# Patient Record
Sex: FEMALE | Race: WHITE | HISPANIC OR LATINO | Employment: FULL TIME | URBAN - METROPOLITAN AREA
[De-identification: names, ages, dates, MRNs, and addresses within clinical notes are randomized per-mention and may not be internally consistent; named-entity substitution may affect disease eponyms.]

---

## 2021-05-25 ENCOUNTER — RECORDS - HEALTHEAST (OUTPATIENT)
Dept: ADMINISTRATIVE | Facility: CLINIC | Age: 51
End: 2021-05-25

## 2021-05-28 ENCOUNTER — RECORDS - HEALTHEAST (OUTPATIENT)
Dept: ADMINISTRATIVE | Facility: CLINIC | Age: 51
End: 2021-05-28

## 2021-05-29 ENCOUNTER — RECORDS - HEALTHEAST (OUTPATIENT)
Dept: ADMINISTRATIVE | Facility: CLINIC | Age: 51
End: 2021-05-29

## 2021-05-30 ENCOUNTER — RECORDS - HEALTHEAST (OUTPATIENT)
Dept: ADMINISTRATIVE | Facility: CLINIC | Age: 51
End: 2021-05-30

## 2021-05-31 ENCOUNTER — RECORDS - HEALTHEAST (OUTPATIENT)
Dept: ADMINISTRATIVE | Facility: CLINIC | Age: 51
End: 2021-05-31

## 2021-06-01 ENCOUNTER — RECORDS - HEALTHEAST (OUTPATIENT)
Dept: ADMINISTRATIVE | Facility: CLINIC | Age: 51
End: 2021-06-01

## 2022-12-30 ENCOUNTER — HOSPITAL ENCOUNTER (EMERGENCY)
Facility: CLINIC | Age: 52
End: 2022-12-30

## 2022-12-30 ENCOUNTER — HOSPITAL ENCOUNTER (EMERGENCY)
Facility: CLINIC | Age: 52
Discharge: HOME | End: 2022-12-31
Attending: EMERGENCY MEDICINE | Admitting: EMERGENCY MEDICINE
Payer: COMMERCIAL

## 2022-12-30 VITALS
OXYGEN SATURATION: 98 % | RESPIRATION RATE: 18 BRPM | SYSTOLIC BLOOD PRESSURE: 142 MMHG | HEART RATE: 111 BPM | TEMPERATURE: 100.2 F | DIASTOLIC BLOOD PRESSURE: 91 MMHG

## 2022-12-30 DIAGNOSIS — R10.9 RIGHT FLANK PAIN: ICD-10-CM

## 2022-12-30 LAB
ALBUMIN UR-MCNC: NEGATIVE MG/DL
ANION GAP SERPL CALCULATED.3IONS-SCNC: 8 MMOL/L (ref 3–14)
APPEARANCE UR: CLEAR
BASOPHILS # BLD AUTO: 0 10E3/UL (ref 0–0.2)
BASOPHILS NFR BLD AUTO: 0 %
BILIRUB UR QL STRIP: NEGATIVE
BUN SERPL-MCNC: 24 MG/DL (ref 7–30)
CALCIUM SERPL-MCNC: 9.3 MG/DL (ref 8.5–10.1)
CHLORIDE BLD-SCNC: 104 MMOL/L (ref 94–109)
CO2 SERPL-SCNC: 26 MMOL/L (ref 20–32)
COLOR UR AUTO: NORMAL
CREAT SERPL-MCNC: 0.74 MG/DL (ref 0.52–1.04)
EOSINOPHIL # BLD AUTO: 0.1 10E3/UL (ref 0–0.7)
EOSINOPHIL NFR BLD AUTO: 1 %
ERYTHROCYTE [DISTWIDTH] IN BLOOD BY AUTOMATED COUNT: 12.7 % (ref 10–15)
GFR SERPL CREATININE-BSD FRML MDRD: >90 ML/MIN/1.73M2
GLUCOSE BLD-MCNC: 107 MG/DL (ref 70–99)
GLUCOSE UR STRIP-MCNC: NEGATIVE MG/DL
HCG UR QL: NEGATIVE
HCT VFR BLD AUTO: 41.6 % (ref 35–47)
HGB BLD-MCNC: 13.9 G/DL (ref 11.7–15.7)
HGB UR QL STRIP: NEGATIVE
IMM GRANULOCYTES # BLD: 0 10E3/UL
IMM GRANULOCYTES NFR BLD: 0 %
KETONES UR STRIP-MCNC: NEGATIVE MG/DL
LEUKOCYTE ESTERASE UR QL STRIP: NEGATIVE
LYMPHOCYTES # BLD AUTO: 2.6 10E3/UL (ref 0.8–5.3)
LYMPHOCYTES NFR BLD AUTO: 29 %
MCH RBC QN AUTO: 30 PG (ref 26.5–33)
MCHC RBC AUTO-ENTMCNC: 33.4 G/DL (ref 31.5–36.5)
MCV RBC AUTO: 90 FL (ref 78–100)
MONOCYTES # BLD AUTO: 0.7 10E3/UL (ref 0–1.3)
MONOCYTES NFR BLD AUTO: 7 %
NEUTROPHILS # BLD AUTO: 5.6 10E3/UL (ref 1.6–8.3)
NEUTROPHILS NFR BLD AUTO: 63 %
NITRATE UR QL: NEGATIVE
NRBC # BLD AUTO: 0 10E3/UL
NRBC BLD AUTO-RTO: 0 /100
PH UR STRIP: 5.5 [PH] (ref 5–7)
PLATELET # BLD AUTO: 251 10E3/UL (ref 150–450)
POTASSIUM BLD-SCNC: 3.8 MMOL/L (ref 3.4–5.3)
RBC # BLD AUTO: 4.64 10E6/UL (ref 3.8–5.2)
SODIUM SERPL-SCNC: 138 MMOL/L (ref 133–144)
SP GR UR STRIP: 1.02 (ref 1–1.03)
UROBILINOGEN UR STRIP-MCNC: NORMAL MG/DL
WBC # BLD AUTO: 9.1 10E3/UL (ref 4–11)

## 2022-12-30 PROCEDURE — 258N000003 HC RX IP 258 OP 636: Performed by: EMERGENCY MEDICINE

## 2022-12-30 PROCEDURE — 250N000011 HC RX IP 250 OP 636: Performed by: EMERGENCY MEDICINE

## 2022-12-30 PROCEDURE — 81003 URINALYSIS AUTO W/O SCOPE: CPT | Performed by: EMERGENCY MEDICINE

## 2022-12-30 PROCEDURE — 80048 BASIC METABOLIC PNL TOTAL CA: CPT | Performed by: EMERGENCY MEDICINE

## 2022-12-30 PROCEDURE — 81025 URINE PREGNANCY TEST: CPT | Performed by: EMERGENCY MEDICINE

## 2022-12-30 PROCEDURE — 96361 HYDRATE IV INFUSION ADD-ON: CPT

## 2022-12-30 PROCEDURE — 36415 COLL VENOUS BLD VENIPUNCTURE: CPT | Performed by: EMERGENCY MEDICINE

## 2022-12-30 PROCEDURE — 96374 THER/PROPH/DIAG INJ IV PUSH: CPT

## 2022-12-30 PROCEDURE — 85025 COMPLETE CBC W/AUTO DIFF WBC: CPT | Performed by: EMERGENCY MEDICINE

## 2022-12-30 PROCEDURE — 99285 EMERGENCY DEPT VISIT HI MDM: CPT | Mod: 25

## 2022-12-30 RX ORDER — ONDANSETRON 2 MG/ML
4 INJECTION INTRAMUSCULAR; INTRAVENOUS ONCE
Status: COMPLETED | OUTPATIENT
Start: 2022-12-30 | End: 2022-12-30

## 2022-12-30 RX ORDER — ONDANSETRON 2 MG/ML
INJECTION INTRAMUSCULAR; INTRAVENOUS
Status: DISCONTINUED
Start: 2022-12-30 | End: 2022-12-30 | Stop reason: HOSPADM

## 2022-12-30 RX ADMIN — ONDANSETRON 4 MG: 2 INJECTION INTRAMUSCULAR; INTRAVENOUS at 20:33

## 2022-12-30 RX ADMIN — SODIUM CHLORIDE 1000 ML: 9 INJECTION, SOLUTION INTRAVENOUS at 20:37

## 2022-12-31 ENCOUNTER — APPOINTMENT (OUTPATIENT)
Dept: CT IMAGING | Facility: CLINIC | Age: 52
End: 2022-12-31
Attending: EMERGENCY MEDICINE
Payer: COMMERCIAL

## 2022-12-31 PROCEDURE — 96376 TX/PRO/DX INJ SAME DRUG ADON: CPT

## 2022-12-31 PROCEDURE — 250N000011 HC RX IP 250 OP 636: Performed by: EMERGENCY MEDICINE

## 2022-12-31 PROCEDURE — 96375 TX/PRO/DX INJ NEW DRUG ADDON: CPT

## 2022-12-31 PROCEDURE — 74176 CT ABD & PELVIS W/O CONTRAST: CPT

## 2022-12-31 RX ORDER — KETOROLAC TROMETHAMINE 15 MG/ML
15 INJECTION, SOLUTION INTRAMUSCULAR; INTRAVENOUS ONCE
Status: COMPLETED | OUTPATIENT
Start: 2022-12-31 | End: 2022-12-31

## 2022-12-31 RX ORDER — OXYCODONE HYDROCHLORIDE 5 MG/1
5 TABLET ORAL EVERY 6 HOURS PRN
Qty: 6 TABLET | Refills: 0 | Status: SHIPPED | OUTPATIENT
Start: 2022-12-31 | End: 2023-01-03

## 2022-12-31 RX ORDER — ONDANSETRON 2 MG/ML
4 INJECTION INTRAMUSCULAR; INTRAVENOUS EVERY 30 MIN PRN
Status: DISCONTINUED | OUTPATIENT
Start: 2022-12-31 | End: 2022-12-31 | Stop reason: HOSPADM

## 2022-12-31 RX ORDER — CEPHALEXIN 500 MG/1
500 CAPSULE ORAL 4 TIMES DAILY
Qty: 28 CAPSULE | Refills: 0 | Status: SHIPPED | OUTPATIENT
Start: 2022-12-31 | End: 2023-01-07

## 2022-12-31 RX ADMIN — ONDANSETRON 4 MG: 2 INJECTION INTRAMUSCULAR; INTRAVENOUS at 00:08

## 2022-12-31 RX ADMIN — KETOROLAC TROMETHAMINE 15 MG: 15 INJECTION, SOLUTION INTRAMUSCULAR; INTRAVENOUS at 00:07

## 2022-12-31 ASSESSMENT — ACTIVITIES OF DAILY LIVING (ADL): ADLS_ACUITY_SCORE: 35

## 2022-12-31 ASSESSMENT — ENCOUNTER SYMPTOMS
BACK PAIN: 0
SHORTNESS OF BREATH: 0
FEVER: 0
ABDOMINAL PAIN: 0
FLANK PAIN: 1
NAUSEA: 1

## 2022-12-31 NOTE — ED TRIAGE NOTES
Pt reports hx of kidney stones with surgery 7 years ago, today onset L flank pain with nausea.      Triage Assessment     Row Name 12/30/22 2012       Triage Assessment (Adult)    Airway WDL WDL       Respiratory WDL    Respiratory WDL WDL       Skin Circulation/Temperature WDL    Skin Circulation/Temperature WDL WDL       Cardiac WDL    Cardiac WDL WDL       Peripheral/Neurovascular WDL    Peripheral Neurovascular WDL WDL       Cognitive/Neuro/Behavioral WDL    Cognitive/Neuro/Behavioral WDL WDL

## 2022-12-31 NOTE — ED PROVIDER NOTES
History   Chief Complaint:  Flank Pain       The history is provided by the patient.      Amalia Blackwood is a 52 year old female with history of nephrolithiasis who presents with right sided flank pain. Today at 1300, patient reports developing right sided flank pain and nausea. She notes that the pain does not radiate down her legs. She states that she has a history of kidney stones and states that most of her stones get stuck in her kidneys. She notes that usually a balloon has been placed that helps move the stones but 7 years ago she received a laser procedure on her kidneys to reduce frequency of kidney stones. Patient reports that movement exacerbates the pain. Patient denies fever, abdominal pain, chest pain, shortness of breath, or lower back pain.  She denies saddle anesthesia, bowel or bladder incontinence, history of lumbar spine surgery, IV drug use.      Review of Systems   Constitutional: Negative for fever.   Respiratory: Negative for shortness of breath.    Cardiovascular: Negative for chest pain.   Gastrointestinal: Positive for nausea. Negative for abdominal pain.   Genitourinary: Positive for flank pain (right side).   Musculoskeletal: Negative for back pain.   All other systems reviewed and are negative.        Allergies:  Aspirin   Acetaminophen     Medications:  The patient is currently on no regular medications.    Past Medical History:     Hyperlipidemia  Hypercholesterolemia   Nephrolithiasis      Past Surgical History:    Ureteroscopic stone extraction   Cystoscopy   Stent placement   Laparoscopy   Mass excision   Eye surgery   D&C   Vitrectomy x3   Colonoscopy     Social History:  The patient presents to the ED alone.   Patient presents to the ED via private vehicle.    Physical Exam     Patient Vitals for the past 24 hrs:   BP Temp Temp src Pulse Resp SpO2   12/30/22 2011 (!) 142/91 100.2  F (37.9  C) Oral 111 18 98 %       Physical Exam  General: Laying on the ED bed, no  distress  HEENT: Normocephalic, atraumatic  Cardiac: Warm and well perfused, regular rate and rhythm  Pulm: Breathing comfortably, no accessory muscle usage, no conversational dyspnea, and lungs clear bilaterally  GI: Abdomen soft, nontender, no rigidity or guarding  MSK: No deformities, right CVAT  Skin: Warm and dry  Neuro: Moves all extremities, sensory intact distal L4-S1 dermatomes bilaterally, 5/5 strength bilateral ankle plantar and dorsiflexion  Psych: Normal mood and affect      Emergency Department Course     Imaging:  CT Abdomen Pelvis w/o Contrast   Final Result   IMPRESSION:    1.  Normal appendix. No obstruction, colitis, diverticulitis, or appendicitis.   2.  Bilateral nonobstructing renal stones. No obstructing ureteral stones.           Report per radiology    Laboratory:  Labs Ordered and Resulted from Time of ED Arrival to Time of ED Departure   BASIC METABOLIC PANEL - Abnormal       Result Value    Sodium 138      Potassium 3.8      Chloride 104      Carbon Dioxide (CO2) 26      Anion Gap 8      Urea Nitrogen 24      Creatinine 0.74      Calcium 9.3      Glucose 107 (*)     GFR Estimate >90     UA MACROSCOPIC WITH REFLEX TO MICRO AND CULTURE - Normal    Color Urine Light Yellow      Appearance Urine Clear      Glucose Urine Negative      Bilirubin Urine Negative      Ketones Urine Negative      Specific Gravity Urine 1.023      Blood Urine Negative      pH Urine 5.5      Protein Albumin Urine Negative      Urobilinogen Urine Normal      Nitrite Urine Negative      Leukocyte Esterase Urine Negative     HCG QUALITATIVE URINE - Normal    hCG Urine Qualitative Negative     CBC WITH PLATELETS AND DIFFERENTIAL    WBC Count 9.1      RBC Count 4.64      Hemoglobin 13.9      Hematocrit 41.6      MCV 90      MCH 30.0      MCHC 33.4      RDW 12.7      Platelet Count 251      % Neutrophils 63      % Lymphocytes 29      % Monocytes 7      % Eosinophils 1      % Basophils 0      % Immature Granulocytes 0       NRBCs per 100 WBC 0      Absolute Neutrophils 5.6      Absolute Lymphocytes 2.6      Absolute Monocytes 0.7      Absolute Eosinophils 0.1      Absolute Basophils 0.0      Absolute Immature Granulocytes 0.0      Absolute NRBCs 0.0        Emergency Department Course:       Reviewed:  I reviewed nursing notes, vitals, past medical history and Care Everywhere    Assessments:   I obtained history and examined the patient as noted above.   100 I rechecked the patient and explained findings. At this point I feel that the patient is safe for discharge, and the patient agrees.    Interventions:   Zofran 4mg IV    NS 1L IV   7 Toradol 15mg IV   8 Zofran 4mg IV     Disposition:  The patient was discharged to home.     Impression & Plan     CMS Diagnoses: None    Medical Decision Makin-year-old female presents with flank pain as above.  She has a history of kidney stones and thinks that this pain is similar.  She is well-appearing on exam and does not appear systemically ill.  Her CT shows bilateral renal stones with no signs of obstruction and no stones in the ureters.  She states that she has had pain in the past from stones that are still in the kidneys and thinks that tonight's episode is from the same.  Also considered lumbar back pain as a possible etiology.  This remains a possibility, the patient has no signs of cauda equina.  The patient's temperature here was 100.2 Fahrenheit and in the setting of her flank pain electing to treat with Keflex despite her negative UA.  Plan at this time is for discharge home with prescriptions for Keflex, oxycodone for pain, and urology follow-up at the patient's previous provider.    Diagnosis:    ICD-10-CM    1. Right flank pain  R10.9           Discharge Medications:  Discharge Medication List as of 2022  1:27 AM      START taking these medications    Details   cephALEXin (KEFLEX) 500 MG capsule Take 1 capsule (500 mg) by mouth 4 times daily for 7 days,  Disp-28 capsule, R-0, E-Prescribe      oxyCODONE (ROXICODONE) 5 MG tablet Take 1 tablet (5 mg) by mouth every 6 hours as needed for severe pain (7-10), Disp-6 tablet, R-0, E-Prescribe             Scribe Disclosure:  I, Inez Collins, am serving as a scribe at 11:54 PM on 12/30/2022 to document services personally performed by Tye Gaytan MD based on my observations and the provider's statements to me.            Tye Gaytan MD  12/31/22 3627

## 2024-01-10 LAB
ALBUMIN SERPL BCG-MCNC: 4.3 G/DL (ref 3.5–5.2)
ALP SERPL-CCNC: 122 U/L (ref 40–150)
ALT SERPL W P-5'-P-CCNC: 35 U/L (ref 0–50)
ANION GAP SERPL CALCULATED.3IONS-SCNC: 7 MMOL/L (ref 7–15)
AST SERPL W P-5'-P-CCNC: 25 U/L (ref 0–45)
BASOPHILS # BLD AUTO: 0 10E3/UL (ref 0–0.2)
BASOPHILS NFR BLD AUTO: 1 %
BILIRUB SERPL-MCNC: 0.3 MG/DL
BUN SERPL-MCNC: 18.4 MG/DL (ref 6–20)
CALCIUM SERPL-MCNC: 9.4 MG/DL (ref 8.6–10)
CHLORIDE SERPL-SCNC: 103 MMOL/L (ref 98–107)
CREAT SERPL-MCNC: 0.62 MG/DL (ref 0.51–0.95)
DEPRECATED HCO3 PLAS-SCNC: 28 MMOL/L (ref 22–29)
EGFRCR SERPLBLD CKD-EPI 2021: >90 ML/MIN/1.73M2
EOSINOPHIL # BLD AUTO: 0.1 10E3/UL (ref 0–0.7)
EOSINOPHIL NFR BLD AUTO: 2 %
ERYTHROCYTE [DISTWIDTH] IN BLOOD BY AUTOMATED COUNT: 12.4 % (ref 10–15)
GLUCOSE SERPL-MCNC: 118 MG/DL (ref 70–99)
HCT VFR BLD AUTO: 39.4 % (ref 35–47)
HGB BLD-MCNC: 13.2 G/DL (ref 11.7–15.7)
HOLD SPECIMEN: NORMAL
IMM GRANULOCYTES # BLD: 0 10E3/UL
IMM GRANULOCYTES NFR BLD: 0 %
LYMPHOCYTES # BLD AUTO: 2.9 10E3/UL (ref 0.8–5.3)
LYMPHOCYTES NFR BLD AUTO: 45 %
MCH RBC QN AUTO: 28.9 PG (ref 26.5–33)
MCHC RBC AUTO-ENTMCNC: 33.5 G/DL (ref 31.5–36.5)
MCV RBC AUTO: 86 FL (ref 78–100)
MONOCYTES # BLD AUTO: 0.5 10E3/UL (ref 0–1.3)
MONOCYTES NFR BLD AUTO: 8 %
NEUTROPHILS # BLD AUTO: 2.8 10E3/UL (ref 1.6–8.3)
NEUTROPHILS NFR BLD AUTO: 44 %
NRBC # BLD AUTO: 0 10E3/UL
NRBC BLD AUTO-RTO: 0 /100
PLATELET # BLD AUTO: 241 10E3/UL (ref 150–450)
POTASSIUM SERPL-SCNC: 4.3 MMOL/L (ref 3.4–5.3)
PROT SERPL-MCNC: 6.8 G/DL (ref 6.4–8.3)
RBC # BLD AUTO: 4.56 10E6/UL (ref 3.8–5.2)
SODIUM SERPL-SCNC: 138 MMOL/L (ref 135–145)
TROPONIN T SERPL HS-MCNC: <6 NG/L
WBC # BLD AUTO: 6.4 10E3/UL (ref 4–11)

## 2024-01-10 PROCEDURE — 84484 ASSAY OF TROPONIN QUANT: CPT | Performed by: EMERGENCY MEDICINE

## 2024-01-10 PROCEDURE — 93005 ELECTROCARDIOGRAM TRACING: CPT

## 2024-01-10 PROCEDURE — 85025 COMPLETE CBC W/AUTO DIFF WBC: CPT | Performed by: EMERGENCY MEDICINE

## 2024-01-10 PROCEDURE — 80053 COMPREHEN METABOLIC PANEL: CPT | Performed by: EMERGENCY MEDICINE

## 2024-01-10 PROCEDURE — 99285 EMERGENCY DEPT VISIT HI MDM: CPT | Mod: 25

## 2024-01-10 PROCEDURE — 36415 COLL VENOUS BLD VENIPUNCTURE: CPT | Performed by: EMERGENCY MEDICINE

## 2024-01-11 ENCOUNTER — HOSPITAL ENCOUNTER (OUTPATIENT)
Facility: CLINIC | Age: 54
Setting detail: OBSERVATION
Discharge: HOME | End: 2024-01-12
Attending: EMERGENCY MEDICINE | Admitting: INTERNAL MEDICINE
Payer: COMMERCIAL

## 2024-01-11 ENCOUNTER — TRANSCRIBE ORDERS (OUTPATIENT)
Dept: OTHER | Age: 54
End: 2024-01-11

## 2024-01-11 ENCOUNTER — APPOINTMENT (OUTPATIENT)
Dept: GENERAL RADIOLOGY | Facility: CLINIC | Age: 54
End: 2024-01-11
Attending: EMERGENCY MEDICINE
Payer: COMMERCIAL

## 2024-01-11 DIAGNOSIS — Z88.9 DRUG ALLERGY: Primary | ICD-10-CM

## 2024-01-11 DIAGNOSIS — R07.9 CHEST PAIN, UNSPECIFIED TYPE: ICD-10-CM

## 2024-01-11 DIAGNOSIS — E78.00 PURE HYPERCHOLESTEROLEMIA: ICD-10-CM

## 2024-01-11 DIAGNOSIS — I25.10 CORONARY ARTERY DISEASE INVOLVING NATIVE CORONARY ARTERY OF NATIVE HEART, UNSPECIFIED WHETHER ANGINA PRESENT: Primary | ICD-10-CM

## 2024-01-11 LAB
ACT BLD: 278 SECONDS (ref 74–150)
ATRIAL RATE - MUSE: 83 BPM
DIASTOLIC BLOOD PRESSURE - MUSE: NORMAL MMHG
INTERPRETATION ECG - MUSE: NORMAL
P AXIS - MUSE: 12 DEGREES
PR INTERVAL - MUSE: 170 MS
QRS DURATION - MUSE: 74 MS
QT - MUSE: 360 MS
QTC - MUSE: 423 MS
R AXIS - MUSE: 37 DEGREES
SYSTOLIC BLOOD PRESSURE - MUSE: NORMAL MMHG
T AXIS - MUSE: 37 DEGREES
TROPONIN T SERPL HS-MCNC: <6 NG/L
VENTRICULAR RATE- MUSE: 83 BPM

## 2024-01-11 PROCEDURE — C9600 PERC DRUG-EL COR STENT SING: HCPCS | Performed by: INTERNAL MEDICINE

## 2024-01-11 PROCEDURE — 250N000009 HC RX 250: Performed by: INTERNAL MEDICINE

## 2024-01-11 PROCEDURE — C1769 GUIDE WIRE: HCPCS | Performed by: INTERNAL MEDICINE

## 2024-01-11 PROCEDURE — 99152 MOD SED SAME PHYS/QHP 5/>YRS: CPT | Mod: GC | Performed by: INTERNAL MEDICINE

## 2024-01-11 PROCEDURE — 96361 HYDRATE IV INFUSION ADD-ON: CPT | Mod: 59

## 2024-01-11 PROCEDURE — 71046 X-RAY EXAM CHEST 2 VIEWS: CPT

## 2024-01-11 PROCEDURE — 84484 ASSAY OF TROPONIN QUANT: CPT | Performed by: INTERNAL MEDICINE

## 2024-01-11 PROCEDURE — G0378 HOSPITAL OBSERVATION PER HR: HCPCS

## 2024-01-11 PROCEDURE — 99152 MOD SED SAME PHYS/QHP 5/>YRS: CPT | Performed by: INTERNAL MEDICINE

## 2024-01-11 PROCEDURE — 272N000001 HC OR GENERAL SUPPLY STERILE: Performed by: INTERNAL MEDICINE

## 2024-01-11 PROCEDURE — C1894 INTRO/SHEATH, NON-LASER: HCPCS | Performed by: INTERNAL MEDICINE

## 2024-01-11 PROCEDURE — C1725 CATH, TRANSLUMIN NON-LASER: HCPCS | Performed by: INTERNAL MEDICINE

## 2024-01-11 PROCEDURE — 250N000011 HC RX IP 250 OP 636: Performed by: INTERNAL MEDICINE

## 2024-01-11 PROCEDURE — 93454 CORONARY ARTERY ANGIO S&I: CPT | Mod: 26 | Performed by: INTERNAL MEDICINE

## 2024-01-11 PROCEDURE — 85347 COAGULATION TIME ACTIVATED: CPT

## 2024-01-11 PROCEDURE — 93005 ELECTROCARDIOGRAM TRACING: CPT

## 2024-01-11 PROCEDURE — 250N000013 HC RX MED GY IP 250 OP 250 PS 637: Performed by: INTERNAL MEDICINE

## 2024-01-11 PROCEDURE — 99207 PR APP CREDIT; MD BILLING SHARED VISIT: CPT | Performed by: INTERNAL MEDICINE

## 2024-01-11 PROCEDURE — 96360 HYDRATION IV INFUSION INIT: CPT | Mod: 59

## 2024-01-11 PROCEDURE — 99153 MOD SED SAME PHYS/QHP EA: CPT | Performed by: INTERNAL MEDICINE

## 2024-01-11 PROCEDURE — C1887 CATHETER, GUIDING: HCPCS | Performed by: INTERNAL MEDICINE

## 2024-01-11 PROCEDURE — 92928 PRQ TCAT PLMT NTRAC ST 1 LES: CPT | Mod: LD | Performed by: INTERNAL MEDICINE

## 2024-01-11 PROCEDURE — 258N000003 HC RX IP 258 OP 636: Performed by: INTERNAL MEDICINE

## 2024-01-11 PROCEDURE — 93010 ELECTROCARDIOGRAM REPORT: CPT | Performed by: INTERNAL MEDICINE

## 2024-01-11 PROCEDURE — C1874 STENT, COATED/COV W/DEL SYS: HCPCS | Performed by: INTERNAL MEDICINE

## 2024-01-11 PROCEDURE — 93454 CORONARY ARTERY ANGIO S&I: CPT | Performed by: INTERNAL MEDICINE

## 2024-01-11 PROCEDURE — 36415 COLL VENOUS BLD VENIPUNCTURE: CPT | Performed by: INTERNAL MEDICINE

## 2024-01-11 PROCEDURE — 999N000054 HC STATISTIC EKG NON-CHARGEABLE

## 2024-01-11 PROCEDURE — 99222 1ST HOSP IP/OBS MODERATE 55: CPT | Mod: 25 | Performed by: NURSE PRACTITIONER

## 2024-01-11 PROCEDURE — 99221 1ST HOSP IP/OBS SF/LOW 40: CPT | Performed by: INTERNAL MEDICINE

## 2024-01-11 DEVICE — STENT CORONARY DES SYNERGY XD MR US 3.00X28MM H7493941828300: Type: IMPLANTABLE DEVICE | Status: FUNCTIONAL

## 2024-01-11 RX ORDER — CLOPIDOGREL BISULFATE 75 MG/1
75 TABLET ORAL DAILY
Status: DISCONTINUED | OUTPATIENT
Start: 2024-01-11 | End: 2024-01-11

## 2024-01-11 RX ORDER — FENTANYL CITRATE 50 UG/ML
INJECTION, SOLUTION INTRAMUSCULAR; INTRAVENOUS
Status: DISCONTINUED | OUTPATIENT
Start: 2024-01-11 | End: 2024-01-11 | Stop reason: HOSPADM

## 2024-01-11 RX ORDER — METOPROLOL TARTRATE 1 MG/ML
5 INJECTION, SOLUTION INTRAVENOUS
Status: DISCONTINUED | OUTPATIENT
Start: 2024-01-11 | End: 2024-01-12 | Stop reason: HOSPADM

## 2024-01-11 RX ORDER — NITROGLYCERIN 0.4 MG/1
0.4 TABLET SUBLINGUAL EVERY 5 MIN PRN
Status: DISCONTINUED | OUTPATIENT
Start: 2024-01-11 | End: 2024-01-11

## 2024-01-11 RX ORDER — SOD.CHLORID/POTASSIUM CHLORIDE 287-180-15
1 TABLET ORAL 3 TIMES DAILY PRN
COMMUNITY

## 2024-01-11 RX ORDER — POTASSIUM CHLORIDE 1500 MG/1
20 TABLET, EXTENDED RELEASE ORAL
Status: DISCONTINUED | OUTPATIENT
Start: 2024-01-11 | End: 2024-01-11 | Stop reason: HOSPADM

## 2024-01-11 RX ORDER — CLOPIDOGREL BISULFATE 75 MG/1
75 TABLET ORAL DAILY
COMMUNITY
End: 2024-01-18

## 2024-01-11 RX ORDER — ACETAMINOPHEN 650 MG/1
650 SUPPOSITORY RECTAL EVERY 4 HOURS PRN
Status: DISCONTINUED | OUTPATIENT
Start: 2024-01-11 | End: 2024-01-12 | Stop reason: HOSPADM

## 2024-01-11 RX ORDER — NITROGLYCERIN 5 MG/ML
VIAL (ML) INTRAVENOUS
Status: DISCONTINUED | OUTPATIENT
Start: 2024-01-11 | End: 2024-01-11 | Stop reason: HOSPADM

## 2024-01-11 RX ORDER — VERAPAMIL HYDROCHLORIDE 2.5 MG/ML
INJECTION, SOLUTION INTRAVENOUS
Status: DISCONTINUED | OUTPATIENT
Start: 2024-01-11 | End: 2024-01-11 | Stop reason: HOSPADM

## 2024-01-11 RX ORDER — ONDANSETRON 4 MG/1
4 TABLET, ORALLY DISINTEGRATING ORAL EVERY 6 HOURS PRN
Status: DISCONTINUED | OUTPATIENT
Start: 2024-01-11 | End: 2024-01-12 | Stop reason: HOSPADM

## 2024-01-11 RX ORDER — LORAZEPAM 2 MG/ML
0.5 INJECTION INTRAMUSCULAR
Status: DISCONTINUED | OUTPATIENT
Start: 2024-01-11 | End: 2024-01-11 | Stop reason: HOSPADM

## 2024-01-11 RX ORDER — HEPARIN SODIUM 1000 [USP'U]/ML
INJECTION, SOLUTION INTRAVENOUS; SUBCUTANEOUS
Status: DISCONTINUED | OUTPATIENT
Start: 2024-01-11 | End: 2024-01-11 | Stop reason: HOSPADM

## 2024-01-11 RX ORDER — HYDRALAZINE HYDROCHLORIDE 20 MG/ML
10 INJECTION INTRAMUSCULAR; INTRAVENOUS EVERY 4 HOURS PRN
Status: DISCONTINUED | OUTPATIENT
Start: 2024-01-11 | End: 2024-01-12 | Stop reason: HOSPADM

## 2024-01-11 RX ORDER — LORAZEPAM 0.5 MG/1
0.5 TABLET ORAL
Status: DISCONTINUED | OUTPATIENT
Start: 2024-01-11 | End: 2024-01-11 | Stop reason: HOSPADM

## 2024-01-11 RX ORDER — SODIUM CHLORIDE 9 MG/ML
INJECTION, SOLUTION INTRAVENOUS CONTINUOUS
Status: DISCONTINUED | OUTPATIENT
Start: 2024-01-11 | End: 2024-01-12

## 2024-01-11 RX ORDER — IOPAMIDOL 755 MG/ML
INJECTION, SOLUTION INTRAVASCULAR
Status: DISCONTINUED | OUTPATIENT
Start: 2024-01-11 | End: 2024-01-11 | Stop reason: HOSPADM

## 2024-01-11 RX ORDER — MAGNESIUM HYDROXIDE/ALUMINUM HYDROXICE/SIMETHICONE 120; 1200; 1200 MG/30ML; MG/30ML; MG/30ML
30 SUSPENSION ORAL EVERY 4 HOURS PRN
Status: DISCONTINUED | OUTPATIENT
Start: 2024-01-11 | End: 2024-01-12 | Stop reason: HOSPADM

## 2024-01-11 RX ORDER — ONDANSETRON 2 MG/ML
4 INJECTION INTRAMUSCULAR; INTRAVENOUS EVERY 6 HOURS PRN
Status: DISCONTINUED | OUTPATIENT
Start: 2024-01-11 | End: 2024-01-12 | Stop reason: HOSPADM

## 2024-01-11 RX ORDER — NITROGLYCERIN 0.4 MG/1
0.4 TABLET SUBLINGUAL EVERY 5 MIN PRN
Status: DISCONTINUED | OUTPATIENT
Start: 2024-01-11 | End: 2024-01-12 | Stop reason: HOSPADM

## 2024-01-11 RX ORDER — SODIUM CHLORIDE 9 MG/ML
INJECTION, SOLUTION INTRAVENOUS CONTINUOUS
Status: DISCONTINUED | OUTPATIENT
Start: 2024-01-11 | End: 2024-01-11 | Stop reason: HOSPADM

## 2024-01-11 RX ORDER — ROSUVASTATIN CALCIUM 20 MG/1
20 TABLET, COATED ORAL EVERY EVENING
Status: ON HOLD | COMMUNITY
End: 2024-01-12

## 2024-01-11 RX ORDER — ACETAMINOPHEN 325 MG/1
650 TABLET ORAL EVERY 4 HOURS PRN
Status: DISCONTINUED | OUTPATIENT
Start: 2024-01-11 | End: 2024-01-12 | Stop reason: HOSPADM

## 2024-01-11 RX ORDER — LIDOCAINE 40 MG/G
CREAM TOPICAL
Status: DISCONTINUED | OUTPATIENT
Start: 2024-01-11 | End: 2024-01-11 | Stop reason: HOSPADM

## 2024-01-11 RX ADMIN — SODIUM CHLORIDE: 9 INJECTION, SOLUTION INTRAVENOUS at 04:17

## 2024-01-11 RX ADMIN — CLOPIDOGREL BISULFATE 75 MG: 75 TABLET ORAL at 08:00

## 2024-01-11 ASSESSMENT — ACTIVITIES OF DAILY LIVING (ADL)
ADLS_ACUITY_SCORE: 31
ADLS_ACUITY_SCORE: 31
ADLS_ACUITY_SCORE: 33
ADLS_ACUITY_SCORE: 35
ADLS_ACUITY_SCORE: 35
ADLS_ACUITY_SCORE: 31
ADLS_ACUITY_SCORE: 33

## 2024-01-11 NOTE — PROGRESS NOTES
[facility]    Internal Medicine Hospitalist Progress Note  01/11/2024  I evaluated patient on the above date.    Omer Dean Jr., MD  [phone] (p)  Text Page  Vocera        Assessment & Plan New actions/orders today (01/11/2024) are underlined. All lab results in the assessment and plan were reviewed.    Amalia Blackwood is a 53 year old female with history including HLD and POTS; with recent chest pain with abnormal stress test (1/4/2024); who presented 1/10/2024 with chest pain.    On initial evaluation, AFVSS. EKG no ischemic changes. Trop negative. CXR clear.        Chest pain.  Recent abnormal stress test.  Hyperlipidemia.  Hx POTS.  * On admit, noted that pt is active at baseline. In 12/2023, developed retrosternal CP prompting stress test 1/4 which was positive (apical and distal anterior wall ischemia). Started on clopidogrel 75 mg daily (ASA allergy) and rosuvastatin 20 mg daily. Plans had been for allergy consult re: ASA allergy and angiogram.   * Initial presentation as above. presented with atypical stabbing chest pain (different than previous pain). Also with strong nausea. Had soreness in chest. No SOB. Cardiology consulted on admit.  * On 1/11, trop negative. Still with chest soreness. Did note getting COVID-19 and influenza vaccines about a week PTA (notes having symptoms with vaccines ever since diagnosis of POTS).  Recent Labs   Lab 01/11/24  0405 01/10/24  2225   CTROPT <6 <6   - Continue clopidogrel, PRN NTG.  - Continue NPO.  - Cardiology consulted for possible angiogram, appreciate help.    Clinically Significant Risk Factors Present on Admission                # Drug Induced Platelet Defect: home medication list includes an antiplatelet medication        # Overweight: Estimated body mass index is 27.47 kg/m  as calculated from the following:    Height as of this encounter: 1.524 m (5').    Weight as of this encounter: 63.8 kg (140 lb 10.5 oz).                 COVID-19 testing.  COVID-19 PCR Results           No data to display              COVID-19 Antibody Results, Testing for Immunity           No data to display                Diet: NPO for Medical/Clinical Reasons Except for: Meds, Ice Chips    Prophylaxis: PCD's, ambulation.   Coates Catheter: Not present  Lines: None     Code Status: Full Code    Disposition Plan   Expected discharge: Today or tomorrow recommended to prior living arrangement pending  cardiac workup .  Entered: Omer Dean MD 01/11/2024, 10:04 AM         Interval History   Still with chest soreness.  Notes that she had COVID and flu vaccines about a week PTA.  Did note getting COVID-19 and influenza vaccines about a week PTA (notes having symptoms with vaccines ever since diagnosis of POTS).    -Data reviewed today: I reviewed all new labs and imaging over the last 24 hours. I personally reviewed no images or EKG's today.    Physical Exam    , Blood pressure 106/69, pulse 68, temperature 99  F (37.2  C), temperature source Oral, resp. rate 16, height 1.524 m (5'), weight 63.8 kg (140 lb 10.5 oz), SpO2 97%. O2 Device: None (Room air)    Vitals:    01/11/24 0348   Weight: 63.8 kg (140 lb 10.5 oz)     Vital Signs with Ranges  Temp:  [97.3  F (36.3  C)-99  F (37.2  C)] 99  F (37.2  C)  Pulse:  [68-87] 68  Resp:  [15-18] 16  BP: (106-135)/(69-88) 106/69  SpO2:  [97 %-98 %] 97 %  Patient Vitals for the past 24 hrs:   BP Temp Temp src Pulse Resp SpO2 Height Weight   01/11/24 0700 106/69 99  F (37.2  C) Oral 68 16 97 % -- --   01/11/24 0348 115/75 97.5  F (36.4  C) Axillary 73 16 97 % 1.524 m (5') 63.8 kg (140 lb 10.5 oz)   01/11/24 0045 125/88 -- -- 76 15 98 % -- --   01/10/24 2213 135/81 97.3  F (36.3  C) Temporal 87 18 98 % -- --     I/O's Last 24 hours  No intake/output data recorded.    Constitutional: Awake, alert, oriented, pleasant.  Respiratory: Diminished in bases. No crackles or wheezes.  Cardiovascular: RRR, no m/r/g.  GI:  "  Skin/Integumen:   Other:        Data    Labs reviewed.  Recent Labs   Lab 01/10/24  2225   WBC 6.4   HGB 13.2   MCV 86         POTASSIUM 4.3   CHLORIDE 103   CO2 28   BUN 18.4   CR 0.62   ANIONGAP 7   GONZALEZ 9.4   *   ALBUMIN 4.3   PROTTOTAL 6.8   BILITOTAL 0.3   ALKPHOS 122   ALT 35   AST 25     Recent Labs   Lab Test 01/11/24  0405 01/10/24  2225   TROPONIN T HIGH SENSITIVITY <6 <6     Recent Labs   Lab 01/10/24  2225   *      No lab results found.     No results for input(s): \"INR\", \"[id number]\" in the last 168 hours.  Recent Labs   Lab 01/10/24  2225   WBC 6.4       MICRO:  CULTURES (INCLUDING BLOOD AND URINE):  No lab results found in last 7 days.    Recent Results (from the past 24 hour(s))   XR Chest 2 Views    Narrative    EXAM: XR CHEST 2 VIEWS  LOCATION: [facility]  DATE: 1/11/2024    INDICATION: cp  COMPARISON: 12/11/2009      Impression    IMPRESSION: Negative chest.       Medications   All medications were reviewed.    Infusions:   sodium chloride 75 mL/hr at 01/11/24 0417     Scheduled Medications:   clopidogrel  75 mg Oral Daily     PRN Medications:  acetaminophen **OR** acetaminophen, alum & mag hydroxide-simethicone, nitroGLYcerin    "

## 2024-01-11 NOTE — ED PROVIDER NOTES
"History     Chief Complaint:  Chest Pain       The history is provided by the patient.      Amalia Blackwood is a 53 year old female with a history of familial hypercholesterolemia who presents with chest pain. The patient reports that she has been having chest issues since November, with congestion and \"soreness.\" Her PCP recommended getting an echo stress test which she got 1 week ago on 24 with a cardiologist at [facility]. She was started on Statin 2 days ago for hypercholesterolemia. This morning, Amalia had sudden onset of random stabbing chest pain, with episodes lasting 30 seconds intermittently through the day, and occurring with no triggering incident including while laying down. Her last episode was 3.5 hours ago at 2145. She additionally had onset of strong nausea which prompted her to come in to the ED. She denies any triggering incident or muscle strain, stating that she has been told to keep exercise to a minimum. Amalia denies any fever, cough, or shortness of breath. She states that the pain is completely different from her symptoms in November, as it is much more sharp and severe. She denies any recent travel, or any history of smoking, drinking alcohol, or vaping. Amalia notes she was recommended an angiogram but has not been scheduled one.     Independent Historian:    None - patient only    Review of External Notes:  Note from 2024 stress echo patient performed a standard Jerzy protocol stopped after 8-1/2 minutes due to chest discomfort.  There is no findings to suggest ischemia.  The stress echo was positive with inducible ischemia in the apex and distal anterior wall resting EF of 55 to 60%.  Met with Dr. Allison cardiology on .  She had an elevated calcium score of 61.    Medications:    Plavix  Crestor     Past Medical History:    Hypercholesterolemia  Hyperlipidemia  Nephrolithiasis    Past Surgical History:    Genitourinary surgery  FL induced  by D & C  Laparoscopy " with excision of oviduct obstruction    Physical Exam   Patient Vitals for the past 24 hrs:   BP Temp Temp src Pulse Resp SpO2   01/11/24 0045 125/88 -- -- 76 15 98 %   01/10/24 2213 135/81 97.3  F (36.3  C) Temporal 87 18 98 %        Physical Exam  Physical Exam   Constitutional:  Patient is oriented to person, place, and time. They appear well-developed and well-nourished. Mild distress secondary to chest pain.   HENT:   Mouth/Throat:   Oropharynx is clear and moist.   Eyes:    Conjunctivae normal and EOM are normal. Pupils are equal, round, and reactive to light.   Neck:    Normal range of motion.   Cardiovascular: Normal rate, regular rhythm and normal heart sounds.  Exam reveals no gallop and no friction rub.  No murmur heard.  Pulmonary/Chest:  Effort normal and breath sounds normal. Patient has no wheezes. Patient has no rales. No pleuritic pain.  Abdominal:   Soft. Bowel sounds are normal. Patient exhibits no mass. There is no tenderness. There is no rebound and no guarding.   Musculoskeletal:  Normal range of motion. Patient exhibits no edema.   Neurological:   Patient is alert and oriented to person, place, and time. Patient has normal strength. No cranial nerve deficit or sensory deficit. GCS 15.  Skin:   Skin is warm and dry. No rash noted. No erythema.   Psychiatric:   Patient has a normal mood and affect. Patient's behavior is normal. Judgment and thought content normal.     Emergency Department Course   ECG  ECG taken at 2210, ECG read at 0100  Normal sinus rhythm  Low voltage QRS  Borderline ECG   Rate 83 bpm. MI interval 170 ms. QRS duration 74 ms. QT/QTc 360/423 ms. P-R-T axes 12 37 37.    Imaging:  XR Chest 2 Views   Final Result   IMPRESSION: Negative chest.        Report per radiology    Laboratory:  Labs Ordered and Resulted from Time of ED Arrival to Time of ED Departure   COMPREHENSIVE METABOLIC PANEL - Abnormal       Result Value    Sodium 138      Potassium 4.3      Carbon Dioxide (CO2) 28       Anion Gap 7      Urea Nitrogen 18.4      Creatinine 0.62      GFR Estimate >90      Calcium 9.4      Chloride 103      Glucose 118 (*)     Alkaline Phosphatase 122      AST 25      ALT 35      Protein Total 6.8      Albumin 4.3      Bilirubin Total 0.3     TROPONIN T, HIGH SENSITIVITY - Normal    Troponin T, High Sensitivity <6     CBC WITH PLATELETS AND DIFFERENTIAL    WBC Count 6.4      RBC Count 4.56      Hemoglobin 13.2      Hematocrit 39.4      MCV 86      MCH 28.9      MCHC 33.5      RDW 12.4      Platelet Count 241      % Neutrophils 44      % Lymphocytes 45      % Monocytes 8      % Eosinophils 2      % Basophils 1      % Immature Granulocytes 0      NRBCs per 100 WBC 0      Absolute Neutrophils 2.8      Absolute Lymphocytes 2.9      Absolute Monocytes 0.5      Absolute Eosinophils 0.1      Absolute Basophils 0.0      Absolute Immature Granulocytes 0.0      Absolute NRBCs 0.0          Procedures   None    Emergency Department Course & Assessments:             Interventions:  Medications - No data to display     Assessments:  0103 I obtained history and examined the patient as noted above.     Independent Interpretation (X-rays, CTs, rhythm strip):  None    Consultations/Discussion of Management or Tests:  0200 Dr Goff for admission       Social Determinants of Health affecting care:  NOne     Disposition:  The patient was admitted to the hospital under the care of Dr. Goff.     Impression & Plan    CMS Diagnoses: None    Medical Decision Making:  Amalia Blackwood is a 53 year old female presenting to the emergency room with left-sided chest pain.  It is somewhat atypical in nature and that is very stabbing and brief episodes of pain.  She did however have an abnormal stress echo done 1 week ago.  There was inducible ischemia at the apex and distal anterior wall.  She also has an elevated coronary calcium score of 61.  She met with a cardiologist afterward and they agreed to angiogram was indicated  but was told that they could not get this done till sometime in February.  I think given these abnormal tests and her chest pain today that she is coming to hospital for her more expedited angiogram.  Our EKG here today does not show any ST segment elevation or ischemia.  Her cardiac enzyme is fortunately normal.  Chest x-ray does not show any infiltrates effusions mass abnormal mediastinum.  Her symptoms are not concerning for dissection or PE nor does she have any risk factors.  She has allergies to aspirin therefore did not give her any aspirin here.  At this time we will admit her to hospital to Dr. Goff.        Diagnosis:    ICD-10-CM    1. Chest pain, unspecified type  R07.9            Discharge Medications:  New Prescriptions    No medications on file     Scribe Disclosure:  Sixto GAYTAN, am serving as a scribe at 1:14 AM on 1/11/2024 to document services personally performed by Marlena Singletary MD, based on my observations and the provider's statements to me.  1/11/2024   Marlena Singletary MD Bochert, Michelle Ann, MD  01/11/24 0242

## 2024-01-11 NOTE — H&P
History and Physical - Hospitalist Service       Date of Admission:  1/11/2024    Assessment & Plan      Amalia Blackwood is a 53 year old female admitted on 1/11/2024. She presents with chest pain    Chest pain  Abnormal stress test  Hyperlipidemia  Hx POTS  *With hx familial hypercholesterolemia. Active. In Dec developed retrosternal CP prompting stress test 1/4 which was positive (apical and distal anterior wall ischemia). Started on plavix 75 mg daily and rosuvastatin 20 mg daily. Plans had been for allergy consult re: ASA allergy and angiogram.   *presents with atypical stabbing chest pain (different than previous pain). Also with strong nausea. Now with soreness in chest. No SOB. In ED AFVSS. Trop negative. CXR clear. EKG no ischemic changes.   - telemetry   - repeat troponin x 1  - start plavix 75 mg daily  - cardiology consult for possible angio w/ positive stress test  - NPO, IV fluids  - hold statin in obs        Diet:  NPO, IV fluids  DVT Prophylaxis: Ambulate every shift  Coates Catheter: Not present  Lines: None     Cardiac Monitoring: None  Code Status:  Full    Clinically Significant Risk Factors Present on Admission                                  Disposition Plan      Expected Discharge Date: 01/12/2024                  Ronal Goff MD  Hospitalist Service    Securely message with [employer] (more info)  Text page via [employer] Paging/Directory     ______________________________________________________________________    Chief Complaint   Chest pain    History is obtained from the patient, electronic health record, and emergency department physician    History of Present Illness   Amalia Blackwood is a 53 year old female who chest pain.  Patient is a very healthy 53-year-old with a history of POTS as well as bilateral hypercholesterolemia.  She is very active and exercises 5 times per week which helps control her POTS symptoms.  She  noted in late November/early December she started to have chest pain for which she saw her primary.  She underwent a stress test on  which was positive with apical and distal anterior wall ischemia.  She saw her cardiologist in follow-up and the plan is for an angiogram as an outpatient.  She also has a history of an aspirin allergy.  She does not know the nature of this allergy.  She states she does not think it was shortness of breath but may have been a rash.  She woke up this morning and she had a sharp stabbing pain in her left upper chest area.  It did not radiate.  It lasted maybe 30 seconds and then started to john.  She had no associated shortness of breath or nausea at that time.  This recurred throughout the day and then this evening came back and she developed severe nausea.  In the emergency department the pain is largely gone but she does have a soreness which has been constant over her left upper chest.  She has no current shortness of breath or nausea.  No abdominal pain.      Past Medical History    Past Medical History:   Diagnosis Date    Kidney stone        Past Surgical History   Past Surgical History:   Procedure Laterality Date    GENITOURINARY SURGERY      IN INDUCED ABORTN BY D&C      Description: Surgically Induced ;  Recorded: 2009;    IN LAP,FULGURATE/EXCISE LESIONS      Description: Laparoscopy With Excision Of Oviduct Obstruction;  Recorded: 2008;       Prior to Admission Medications   None        Review of Systems    The 10 point Review of Systems is negative other than noted in the HPI or here.     Social History   I have reviewed this patient's social history and updated it with pertinent information if needed.  Social History     Tobacco Use    Smoking status: Never    Smokeless tobacco: Never   Substance Use Topics    Drug use: Never        Physical Exam   Vital Signs: Temp: 97.3  F (36.3  C) Temp src: Temporal BP: 125/88 Pulse: 76   Resp: 15 SpO2: 98  % O2 Device: None (Room air)    Weight: 0 lbs 0 oz    General Appearance: Alert, very pleasant, no distress  Respiratory: CTA B  Cardiovascular: RRR, no murmur. No edema  GI: soft, nt/nd  Skin: no rashes or lesions grossly    Other: CN grossly intact, SELLERS      Medical Decision Making       50 MINUTES SPENT BY ME on the date of service doing chart review, history, exam, documentation & further activities per the note.      Data     I have personally reviewed the following data over the past 24 hrs:    6.4  \   13.2   / 241     138 103 18.4 /  118 (H)   4.3 28 0.62 \     ALT: 35 AST: 25 AP: 122 TBILI: 0.3   ALB: 4.3 TOT PROTEIN: 6.8 LIPASE: N/A     Trop: <6 BNP: N/A       Imaging results reviewed over the past 24 hrs:   Recent Results (from the past 24 hour(s))   XR Chest 2 Views    Narrative    EXAM: XR CHEST 2 VIEWS  LOCATION: [facility]  DATE: 1/11/2024    INDICATION: cp  COMPARISON: 12/11/2009      Impression    IMPRESSION: Negative chest.

## 2024-01-11 NOTE — PLAN OF CARE
Goal Outcome Evaluation:    Comments: List all goals to be met before discharge home:  - Serial troponins and stress test complete.- Met  - Seen and cleared by consultant if applicable- Not Met  - Adequate pain control on oral analgesia- Not Met  - Vital signs normal or at patient baseline- Not Met  - Safe disposition plan has been identified- Not Met  - Nurse to notify provider when observation goals have been met and patient is ready for discharge.

## 2024-01-11 NOTE — ED NOTES
[facility]  ED Nurse Handoff Report    ED Chief complaint: Chest Pain      ED Diagnosis:   Final diagnoses:   Chest pain, unspecified type       Code Status: Full Code    Allergies:   Allergies   Allergen Reactions    Aspirin Anaphylaxis       Patient Story: Patient presents with left chest pain.  Pain has been intermittent over the past few weeks.  She recently had cardiac workup done with positive stress test with plan for an angiogram.  Focused Assessment:  Intermittent sharp left chest pain, nausea, and heart burn.  Lab work unremarkable.    Treatments and/or interventions provided: Supportive measures.  Patient's response to treatments and/or interventions: Tolerated well    To be done/followed up on inpatient unit:  Symptom management.    Does this patient have any cognitive concerns?:  none    Activity level - Baseline/Home:  Independent  Activity Level - Current:   Independent    Patient's Preferred language: English   Needed?: No    Isolation: None  Infection: Not Applicable  Patient tested for COVID 19 prior to admission: NO  Bariatric?: No    Vital Signs:   Vitals:    01/10/24 2213 01/11/24 0045   BP: 135/81 125/88   Pulse: 87 76   Resp: 18 15   Temp: 97.3  F (36.3  C)    TempSrc: Temporal    SpO2: 98% 98%       Cardiac Rhythm:     Was the PSS-3 completed:   Yes  What interventions are required if any?               Family Comments: none  OBS brochure/video discussed/provided to patient/family: No              Name of person given brochure if not patient: na              Relationship to patient: na    For the majority of the shift this patient's behavior was Green.   Behavioral interventions performed were none.    ED NURSE PHONE NUMBER: [phone]

## 2024-01-11 NOTE — PROCEDURES
[facility]    Procedure: *Cath with PCI    Date/Time: 1/11/2024 4:51 PM    Performed by: Torsten Delgado MD  Authorized by: Torsten Delgado MD      UNIVERSAL PROTOCOL   Site Marked: Yes  Prior Images Obtained and Reviewed:  Yes  Required items: Required blood products, implants, devices and special equipment available    Patient identity confirmed:  Verbally with patient  Patient was reevaluated immediately before administering moderate or deep sedation or anesthesia  Confirmation Checklist:  Patient's identity using two indicators  Time out: Immediately prior to the procedure a time out was called    Universal Protocol: the Joint Commission Universal Protocol was followed    Preparation: Patient was prepped and draped in usual sterile fashion       ANESTHESIA    Local Anesthetic:  Lidocaine 1% without epinephrine      SEDATION  Patient Sedated: Yes    Sedation:  Fentanyl and midazolam  Vital signs: Vital signs monitored during sedation      PROCEDURE  Describe Procedure: See above PCI mid LAD with no complications  Patient Tolerance:  Patient tolerated the procedure well with no immediate complications  Length of time physician/provider present for 1:1 monitoring during sedation: 45

## 2024-01-11 NOTE — PHARMACY-ADMISSION MEDICATION HISTORY
Pharmacist Admission Medication History    Admission medication history is complete. The information provided in this note is only as accurate as the sources available at the time of the update.    Information Source(s): Patient, Hospital records, and CareEverywhere/SureScripts via in-person    Pertinent Information: Patient states she did not yet start the Clopidogrel since it was prescribed 1/8/24.    Changes made to PTA medication list:  Added: All medications  Deleted: None  Changed: None    Medication Affordability:  Not including over the counter (OTC) medications, was there a time in the past 3 months when you did not take your medications as prescribed because of cost?: No      Medication History Completed By: Blas Contreras RPH 1/11/2024 8:31 AM    PTA Med List   Medication Sig Last Dose    Oral Electrolytes (THERMOTABS) TABS Take 1 tablet by mouth daily 1/10/2024    potassium 99 MG TABS Take 1 tablet by mouth daily 1/10/2024    rosuvastatin (CRESTOR) 20 MG tablet Take 20 mg by mouth every evening 1/10/2024 at PM

## 2024-01-11 NOTE — PRE-PROCEDURE
GENERAL PRE-PROCEDURE:   Procedure:  Coronary angiogram possible percutaneous coronary intervention  Date/Time:  1/11/2024 4:04 PM    Verbal consent obtained?: Yes    Written consent obtained?: Yes    Risks and benefits: Risks, benefits and alternatives were discussed    DC Plan: Appropriate discharge home plan in place for patients who are going home after procedure   Consent given by:  Patient  Patient states understanding of procedure being performed: Yes    Patient's understanding of procedure matches consent: Yes    Procedure consent matches procedure scheduled: Yes    Expected level of sedation:  Moderate  Appropriately NPO:  Yes  ASA Class:  2  Mallampati  :  Grade 2- soft palate, base of uvula, tonsillar pillars, and portion of posterior pharyngeal wall visible  Lungs:  Lungs clear with good breath sounds bilaterally  Heart:  Normal heart sounds and rate  History & Physical reviewed:  History and physical reviewed and no updates needed  Statement of review:  I have reviewed the lab findings, diagnostic data, medications, and the plan for sedation    I have examined the patient, reviewed the history, medications and pre procedural tests. I have explained to the patient the risks of death, MI, stroke, hematoma, possible urgent bypass surgery for failed PCI, use of stents, thienopyridine agents, possible peripheral vascular complications, arrhythmia, the use of FFR in clinical decision-making and alternative of medical therapy alone in regards to left heart catheterization, left ventriculography, coronary angiography, and possible percutaneous coronary intervention. The patient voiced understanding and wishes to proceed. The patient has a good right radial pulse, normal ulnar pulse and a normal Douglas's sign.

## 2024-01-11 NOTE — PROGRESS NOTES
Observation goals  PRIOR TO DISCHARGE          - Serial troponins and stress test complete. Not met  - Seen and cleared by consultant if applicable Not met  - Adequate pain control on oral analgesia Met  - Vital signs normal or at patient baseline Met  - Safe disposition plan has been identified Not met  - Nurse to notify provider when observation goals have been met and patient is ready for discharge. Not met

## 2024-01-11 NOTE — PLAN OF CARE
Goal Outcome Evaluation:      Plan of Care Reviewed With: patient    Overall Patient Progress: no changeOverall Patient Progress: no change    Observation goals  PRIOR TO DISCHARGE        Comments: List all goals to be met before discharge home:  - Serial troponins and stress test complete: Not met; plans for angio  - Seen and cleared by consultant if applicable: Not met  - Adequate pain control on oral analgesia: Met  - Vital signs normal or at patient baseline: Met  - Safe disposition plan has been identified: Met  - Nurse to notify provider when observation goals have been met and patient is ready for discharge.     1/11/24 [id number]    PRIMARY Concern: Chest Pain  SAFETY RISK Concerns (fall risk, behaviors, etc.): NA  Isolation/Type: NA  Tests/Procedures for NEXT shift: Angio 1630.   Consults? (Pending/following, signed-off?) Cardiology following  Where is patient from? (Home, TCU, etc.): Home  Other Important info for NEXT shift: plans for angio this evening. Hx of POTS.   Anticipated DC date & active delays: 1/11 if angio wnl.     SUMMARY NOTE:  Orientation/Cognitive: A&Ox4  Observation Goals (Met/ Not Met): Not met  Mobility Level/Assist Equipment: Independent   Antibiotics & Plan (IV/po, length of tx left): NA  Pain Management: Intermittent L upper chest cramping, goes away quickly.    Tele/VS/O2: VSS on RA  ABNL Lab/BG: See chart. Trops 6 and 6  Diet: NPO  Bowel/Bladder: Continent of B&B, up to the bathroom.   Skin Concerns: WDL, scattered bruises.   Drains/Devices: PIV infusing NS 75mL/hr.  Patient Stated Goal for Today: Sleep

## 2024-01-11 NOTE — Clinical Note
The first balloon was inserted into the left anterior descending.Max pressure = 6 rishi. Total duration = 22 seconds.     Max pressure = 8 rishi. Total duration = 35 seconds.    Balloon reinflated a second time: Max pressure = 8 rishi. Total duration = 35 seconds.

## 2024-01-11 NOTE — Clinical Note
Hemodynamic equipment used: 5 lead ECG, Serena & LilyK With 3 Leads, Machine BP Cuff and pulse oximeter probe.

## 2024-01-11 NOTE — PLAN OF CARE
Goal Outcome Evaluation:      Plan of Care Reviewed With: patient    Overall Patient Progress: no changeOverall Patient Progress: no change      Observation goals  PRIOR TO DISCHARGE        Comments: List all goals to be met before discharge home:  - Serial troponins and stress test complete: Not met  - Seen and cleared by consultant if applicable: Not met  - Adequate pain control on oral analgesia: Met  - Vital signs normal or at patient baseline: Met  - Safe disposition plan has been identified: Met  - Nurse to notify provider when observation goals have been met and patient is ready for discharge.

## 2024-01-11 NOTE — Clinical Note
Stent deployed in the left anterior descending. Max pressure = 11 rishi. Total duration = 20 seconds. Balloon reinflated a second time:

## 2024-01-11 NOTE — CONSULTS
[facility]    Cardiology Consultation     Date of Admission:  1/11/2024    Assessment & Plan   Amalia Blackwood is a 53 year old female who was admitted on 1/11/2024.    Patient's primary cardiologist is  with [facility] cardiology    Patient with a past medical history significant for POTS syndrome, recent abnormal stress test, elevated CT calcium score, and familial hypercholesterolemia.    Abnormal stress test        Elevated CT calcium score        Chest pain  -Routine CT calcium scoring completed last on 12/18/2023 revealing a CT calcium score of 61 placing patient in the 96 percentile when compared to age, gender, and race matched control groups.  Calcium was present in the left anterior descending and right coronary arteries  -Troponin negative x 2  -Recent abnormal stress echocardiogram done at [facility] showing apical and anterior wall motion abnormalities with stress    2.  Familial hypercholesterolemia  -Routine CT calcium scoring completed last on 12/18/2023 revealing a CT calcium score of 61 placing patient in the 96 percentile when compared to age, gender, and race matched control groups.  Calcium was present in the left anterior descending and right coronary arteries, she reports her previous studies were normal  -LDL measuring as high as 253 dating back to 2021  -1/2024 initiated on rosuvastatin 20 mg daily    3.  Strong family history of coronary artery disease  -Father passed away of a MI at age 72, uncles on her mother side both passed away in their 50s of CAD    4.  POTS syndrome, follows with [facility]  -Diagnosed in 2012  -Symptoms well-controlled    Plan:  Keep patient n.p.o. for coronary angiogram today for further evaluation of her chest pain and recent abnormal stress test considering her multiple cardiovascular risk factors including family history of coronary disease as well as hypercholesterolemia.  Risks and benefits of coronary angiogram discussed today  including, bleeding, bruising, infection, allergic reaction, kidney damage (including need for dialysis), stroke, heart attack, vascular damage, emergency open heart surgery, up to and including death.  Patient indicates understanding and is agreeable to proceed.    Patient confirms she does not have a known contrast allergy, no upcoming planned procedures that require holding antiplatelet therapy, and no history of bleeding disorders  Continue Plavix 75 mg daily and rosuvastatin 20 mg daily  Do not give patient preprocedural aspirin, nursing and Cath Lab is aware  Cardiology will continue to follow, Further plans following angiogram today    Moderate complexity     Valerie Chavez NP    Primary Care Physician   Jackson Fallon    Reason for Consult   Reason for consult: I was asked by the hospitalist to evaluate this patient for chest pain with recent abnormal stress test.    History of Present Illness   Amalia Blackwood is a 53 year old female who presents with chest pain.     Patient follows with [facility] for her POTS syndrome which was first diagnosed approximately 15 years ago and has been well controlled majority of that time.  She reports she changed her diet due to her familial hypercholesterolemia and may cutting out meat, reducing her sodium intake and changing to primarily plant-based which has caused some recurrence in her POTS symptoms.    She underwent routine calcium screening through [facility] given her history of familial hypercholesterolemia in December 2023 which resulted with a total score 61 in the RCA and LAD arteries.  Placing her in the 96 percentile when compared to age and gender matched control groups.     She noticed in November that she had some substernal chest discomfort and was seen by Dr. Allison with [facility] cardiology group.  She underwent a treadmill stress echocardiogram on 1/4/2023 which was positive for ischemia in the apex and distal anterior wall, no EKG changes were noted,  ejection fraction was estimated at 55 to 60%, with no significant valvular abnormalities, patient exercised for 8 minutes and 34 seconds achieving 101% of her age-predicted maximum heart rate.  Following stress her LV size decreased and wall motion are present.  The test was terminated due to chest pressure was resolved 1 minute into recovery.  She was started on Plavix 75 mg daily as well as rosuvastatin 20 mg daily following that test and plan was to have her undergo coronary angiogram after aspirin desensitization in February at an allergist clinic.  She is unclear what her aspirin allergy reaction to aspirin.  She told me her mother told her she had a reaction as a child.  She has not had any issues since starting Plavix.    Unfortunately yesterday she noticed that she woke up with some severe stabbing chest discomfort over her left breast which fluctuated throughout the day and later became more significant and had associated nausea which led her to present to the emergency room.  She continues to have this fluctuating discomfort above her left breast since her admission.    She reports during her stress test her chest pain was similar in nature to what it is today, however worsened and radiated down her right arm causing numbness and resolved with rest.    Her troponins on admission were both negative x 2 and her EKG was unremarkable for any acute ST-T wave changes.  CBC and BMP were unremarkable.  Chest x-ray was normal    Patient is a never smoker and is not diabetic.    Past Medical History   Past Medical History:   Diagnosis Date    Kidney stone        Past Surgical History   Past Surgical History:   Procedure Laterality Date    GENITOURINARY SURGERY      WV INDUCED ABORTN BY D&C      Description: Surgically Induced ;  Recorded: 2009;    WV LAP,FULGURATE/EXCISE LESIONS      Description: Laparoscopy With Excision Of [place]uct Obstruction;  Recorded: 2008;         Prior to Admission  Medications   Prior to Admission Medications   Prescriptions Last Dose Informant Patient Reported? Taking?   Oral Electrolytes (THERMOTABS) TABS 1/10/2024  Yes Yes   Sig: Take 1 tablet by mouth daily   clopidogrel (PLAVIX) 75 MG tablet  at NOT STARTED  Yes No   Sig: Take 75 mg by mouth daily   potassium 99 MG TABS 1/10/2024  Yes Yes   Sig: Take 1 tablet by mouth daily   rosuvastatin (CRESTOR) 20 MG tablet 1/10/2024 at PM  Yes Yes   Sig: Take 20 mg by mouth every evening      Facility-Administered Medications: None     Current Facility-Administered Medications   Medication Dose Route Frequency    clopidogrel  75 mg Oral Daily     Current Facility-Administered Medications   Medication Last Rate    sodium chloride 75 mL/hr at 01/11/24 0417     Allergies   Allergies   Allergen Reactions    Aspirin Anaphylaxis       Social History    reports that she has never smoked. She has never used smokeless tobacco. She reports that she does not use drugs.      Family History   Family history of CAD including father passing from an MI at age 72 and 2 uncles on her mother side passing away in their 50s       Review of Systems   A comprehensive review of system was performed and is negative other than that noted in the HPI or here.     Physical Exam   Vital Signs with Ranges  Temp:  [97.3  F (36.3  C)-99  F (37.2  C)] 99  F (37.2  C)  Pulse:  [68-87] 68  Resp:  [15-18] 16  BP: (106-135)/(69-88) 106/69  SpO2:  [97 %-98 %] 97 %  Wt Readings from Last 4 Encounters:   01/11/24 63.8 kg (140 lb 10.5 oz)     No intake/output data recorded.      Vitals: /69 (BP Location: Right arm)   Pulse 68   Temp 99  F (37.2  C) (Oral)   Resp 16   Ht 1.524 m (5')   Wt 63.8 kg (140 lb 10.5 oz)   SpO2 97%   BMI 27.47 kg/m      Physical Exam:   General - Alert and oriented to time place and person in no acute distress  Eyes - No scleral icterus  HEENT - Neck supple, moist mucous membranes  Cardiovascular -normal S1-S2, regular rate and rhythm,  "no murmur  Extremities - There is  edema  Respiratory -breathing nonlabored, no wheezes, rales, rhonchi  Skin - No pallor or cyanosis  Gastrointestinal - Non tender and non distended without rebound or guarding  Psych - Appropriate affect   Neurological - No gross motor neurological focal deficits    No lab results found in last 7 days.    Invalid input(s): \"TROPONINIES\"    Recent Labs   Lab 01/10/24  2225   WBC 6.4   HGB 13.2   MCV 86         POTASSIUM 4.3   CHLORIDE 103   CO2 28   BUN 18.4   CR 0.62   GFRESTIMATED >90   ANIONGAP 7   GONZALEZ 9.4   *   ALBUMIN 4.3   PROTTOTAL 6.8   BILITOTAL 0.3   ALKPHOS 122   ALT 35   AST 25     No results for input(s): \"CHOL\", \"HDL\", \"LDL\", \"TRIG\", \"CHOLHDLRATIO\" in the last 72626 hours.  Recent Labs   Lab 01/10/24  2225   WBC 6.4   HGB 13.2   HCT 39.4   MCV 86        No results for input(s): \"PH\", \"PHV\", \"PO2\", \"PO2V\", \"SAT\", \"PCO2\", \"PCO2V\", \"HCO3\", \"HCO3V\" in the last 168 hours.  No results for input(s): \"NTBNPI\", \"NTBNP\" in the last 168 hours.  No results for input(s): \"DD\" in the last 168 hours.  No results for input(s): \"SED\", \"CRP\" in the last 168 hours.  Recent Labs   Lab 01/10/24  2225        No results for input(s): \"TSH\" in the last 168 hours.  No results for input(s): \"COLOR\", \"APPEARANCE\", \"URINEGLC\", \"URINEBILI\", \"URINEKETONE\", \"SG\", \"UBLD\", \"URINEPH\", \"PROTEIN\", \"UROBILINOGEN\", \"NITRITE\", \"LEUKEST\", \"RBCU\", \"WBCU\" in the last 168 hours.    Imaging:  Recent Results (from the past 48 hour(s))   XR Chest 2 Views    Narrative    EXAM: XR CHEST 2 VIEWS  LOCATION: [facility]  DATE: 1/11/2024    INDICATION: cp  COMPARISON: 12/11/2009      Impression    IMPRESSION: Negative chest.       Echo:  No results found for this or any previous visit (from the past 4320 hour(s)).    Clinically Significant Risk Factors Present on Admission                # Drug Induced Platelet Defect: home medication list includes an " antiplatelet medication        # Overweight: Estimated body mass index is 27.47 kg/m  as calculated from the following:    Height as of this encounter: 1.524 m (5').    Weight as of this encounter: 63.8 kg (140 lb 10.5 oz).

## 2024-01-11 NOTE — Clinical Note
The first balloon was inserted into the left anterior descending.Max pressure = 14 rishi. Total duration = 20 seconds.     Max pressure = 14 rishi. Total duration = 26 seconds.    Balloon reinflated a second time: Max pressure = 14 rishi. Total duration = 26 seconds.  Balloon reinflated a third time: Max pressure = 16 rishi. Total duration = 26 seconds.

## 2024-01-11 NOTE — PLAN OF CARE
Top portion must ALWAYS be completed  PRIMARY Concern: Chest Pain  SAFETY RISK Concerns (fall risk, behaviors, etc.): NA  Isolation/Type: NA  Tests/Procedures for NEXT shift: Possible angio  Consults? (Pending/following, signed-off?) Cardiology  Where is patient from? (Home, TCU, etc.): Home  Other Important info for NEXT shift: NA  Anticipated DC date & active delays: TBD  _____________________________________________________________________________  SUMMARY NOTE:  Orientation/Cognitive: A&Ox4  Observation Goals (Met/ Not Met): Not met  Mobility Level/Assist Equipment: Independent   Antibiotics & Plan (IV/po, length of tx left): NA  Pain Management: Denies pain this shift.   Tele/VS/O2: VSS on RA  ABNL Lab/BG: See chart. Normal trops.  Diet: NPO  Bowel/Bladder: Continent of B&B, up to the bathroom.   Skin Concerns: WDL, scattered bruises.   Drains/Devices: PIV infusing NS 75mL/hr.  Patient Stated Goal for Today: Sleep

## 2024-01-11 NOTE — PROGRESS NOTES
RECEIVING UNIT ED HANDOFF REVIEW    ED Nurse Handoff Report was reviewed by: Smita Sanchez RN on January 11, 2024 at 3:23 AM

## 2024-01-12 VITALS
TEMPERATURE: 98.6 F | BODY MASS INDEX: 27.72 KG/M2 | DIASTOLIC BLOOD PRESSURE: 84 MMHG | HEIGHT: 60 IN | SYSTOLIC BLOOD PRESSURE: 116 MMHG | RESPIRATION RATE: 16 BRPM | OXYGEN SATURATION: 96 % | WEIGHT: 141.2 LBS | HEART RATE: 83 BPM

## 2024-01-12 LAB
ATRIAL RATE - MUSE: 71 BPM
DIASTOLIC BLOOD PRESSURE - MUSE: NORMAL MMHG
INTERPRETATION ECG - MUSE: NORMAL
P AXIS - MUSE: 26 DEGREES
PR INTERVAL - MUSE: 174 MS
QRS DURATION - MUSE: 76 MS
QT - MUSE: 396 MS
QTC - MUSE: 430 MS
R AXIS - MUSE: 50 DEGREES
SYSTOLIC BLOOD PRESSURE - MUSE: NORMAL MMHG
T AXIS - MUSE: 40 DEGREES
VENTRICULAR RATE- MUSE: 71 BPM

## 2024-01-12 PROCEDURE — 99233 SBSQ HOSP IP/OBS HIGH 50: CPT | Performed by: INTERNAL MEDICINE

## 2024-01-12 PROCEDURE — 93010 ELECTROCARDIOGRAM REPORT: CPT | Performed by: INTERNAL MEDICINE

## 2024-01-12 PROCEDURE — G0378 HOSPITAL OBSERVATION PER HR: HCPCS

## 2024-01-12 PROCEDURE — 250N000013 HC RX MED GY IP 250 OP 250 PS 637: Performed by: INTERNAL MEDICINE

## 2024-01-12 PROCEDURE — 93005 ELECTROCARDIOGRAM TRACING: CPT

## 2024-01-12 PROCEDURE — 99239 HOSP IP/OBS DSCHRG MGMT >30: CPT | Performed by: HOSPITALIST

## 2024-01-12 PROCEDURE — 96361 HYDRATE IV INFUSION ADD-ON: CPT

## 2024-01-12 PROCEDURE — 258N000003 HC RX IP 258 OP 636: Performed by: INTERNAL MEDICINE

## 2024-01-12 RX ORDER — CLOPIDOGREL BISULFATE 75 MG/1
75 TABLET ORAL DAILY
Status: DISCONTINUED | OUTPATIENT
Start: 2024-01-13 | End: 2024-01-12 | Stop reason: HOSPADM

## 2024-01-12 RX ORDER — ROSUVASTATIN CALCIUM 20 MG/1
40 TABLET, COATED ORAL AT BEDTIME
Status: DISCONTINUED | OUTPATIENT
Start: 2024-01-12 | End: 2024-01-12 | Stop reason: HOSPADM

## 2024-01-12 RX ORDER — NITROGLYCERIN 0.4 MG/1
0.4 TABLET SUBLINGUAL EVERY 5 MIN PRN
Status: DISCONTINUED | OUTPATIENT
Start: 2024-01-12 | End: 2024-01-12

## 2024-01-12 RX ORDER — ROSUVASTATIN CALCIUM 40 MG/1
40 TABLET, COATED ORAL AT BEDTIME
Qty: 30 TABLET | Refills: 0 | Status: SHIPPED | OUTPATIENT
Start: 2024-01-12 | End: 2024-01-18

## 2024-01-12 RX ORDER — CLOPIDOGREL 300 MG/1
300 TABLET, FILM COATED ORAL ONCE
Qty: 1 TABLET | Refills: 0 | Status: SHIPPED | OUTPATIENT
Start: 2024-01-12 | End: 2024-01-14

## 2024-01-12 RX ORDER — CLOPIDOGREL 300 MG/1
300 TABLET, FILM COATED ORAL ONCE
Status: DISCONTINUED | OUTPATIENT
Start: 2024-01-12 | End: 2024-01-12 | Stop reason: HOSPADM

## 2024-01-12 RX ADMIN — SODIUM CHLORIDE: 9 INJECTION, SOLUTION INTRAVENOUS at 00:21

## 2024-01-12 RX ADMIN — TICAGRELOR 90 MG: 90 TABLET ORAL at 05:20

## 2024-01-12 ASSESSMENT — ACTIVITIES OF DAILY LIVING (ADL)
ADLS_ACUITY_SCORE: 33

## 2024-01-12 NOTE — DISCHARGE SUMMARY
[facility]  Hospitalist Discharge Summary      Date of Admission:  1/11/2024  Date of Discharge:  1/12/2024  Discharging Provider: Jennifer Silva MD  Discharge Service: Hospitalist Service    Discharge Diagnoses   Unstable angina.    Clinically Significant Risk Factors     # Overweight: Estimated body mass index is 27.58 kg/m  as calculated from the following:    Height as of this encounter: 1.524 m (5').    Weight as of this encounter: 64 kg (141 lb 3.2 oz).       Follow-ups Needed After Discharge   Follow-up Appointments     Follow-up and recommended labs and tests       Follow up with primary care provider, Jackson Fallon, within 7 days for   hospital follow- up.  No follow up labs or test are needed.  Follow up with cardiology on discharge            Unresulted Labs Ordered in the Past 30 Days of this Admission       No orders found for last 31 day(s).        These results will be followed up by     Discharge Disposition   Discharged to home  Condition at discharge: Stable    Hospital Course   New actions/orders today (01/11/2024) are underlined. All lab results in the assessment and plan were reviewed.    Amalia Blackwood is a 53 year old female with history including HLD and POTS; with recent chest pain with abnormal stress test (1/4/2024); who presented 1/10/2024 with chest pain.    On initial evaluation, AFVSS. EKG no ischemic changes. Trop negative. CXR clear.        Chest pain.  Recent abnormal stress test.  Hyperlipidemia.  Hx POTS.  * On admit, noted that pt is active at baseline. In 12/2023, developed retrosternal CP prompting stress test 1/4 which was positive (apical and distal anterior wall ischemia). Started on clopidogrel 75 mg daily (ASA allergy) and rosuvastatin 20 mg daily. Plans had been for allergy consult re: ASA allergy and angiogram.   * Initial presentation as above. presented with atypical stabbing chest pain (different than previous pain). Also with strong nausea. Had  soreness in chest. No SOB. Cardiology consulted on admit.  * On 1/11, trop negative. Still with chest soreness. Did note getting COVID-19 and influenza vaccines about a week PTA (notes having symptoms with vaccines ever since diagnosis of POTS).  Cardiology consulted: input appreciated.  Status post coronary angiogram on 1/11/2024 with placement of JANAE stent to the proximal l LAD.  Noted to have moderate disease in the RCA circumflex.  Started on Brilinta post angiogram but this was discontinued due to side effect of shortness of breath.  Discharge home with a Plavix load and instructions to continue 75 mg of Plavix daily thereafter.  Patient to follow-up with cardiology and PCP upon discharge.  Patient to see allergist to discuss aspirin use.     Consultations This Hospital Stay   CARDIOLOGY IP CONSULT  PHARMACY IP CONSULT    Code Status   Full Code    Time Spent on this Encounter   I, Jennifer Silva MD, personally saw the patient today and spent greater than 30 minutes discharging this patient.       Jennifer Silva MD  [facility]  [address] [place] [address], [address] [address]  Phone: [phone]  ______________________________________________________________________    Physical Exam   Vital Signs: Temp: 98.6  F (37  C) Temp src: Oral BP: 118/79 Pulse: 77   Resp: 16 SpO2: 96 % O2 Device: None (Room air)    Weight: 141 lbs 3.2 oz  General Appearance: Well appearing for stated age.  Respiratory: CTAB, no rales or ronchi  Cardiovascular: S1, S2 normal, no murmurs  GI: non-tender on palpation, BS present         Primary Care Physician   Jackson Fallon    Discharge Orders      Follow-Up with Cardiology CAROLYNN      Medication Instructions - Anticoagulants    Do NOT stop your aspirin or platelet inhibitor unless directed by your Cardiologist.  These medications help to prevent platelets in your blood from sticking together and forming a clot.  Examples of these medications are:  Ticagrelor  (Brilinta), Clopidigrel (Plavix), Prasugrel (Effient)     When to call - Contact the Heart Clinic    You may experience symptoms that require follow-up before your scheduled appointment. Contact the Heart Clinic if you develop: Fever over 100.4o Fahrenheit, that lasts more than one day; Redness, heat, or pus at the puncture site; Change in color or temperature in your hand or arm.     When to call - Reasons to Call 911    If your wrist puncture site starts bleeding after discharge, sit down and apply firm pressure with your thumb against the puncture site and fingers against the back of the wrist for 10 minutes. If the bleeding stops, continue to rest, keeping your wrist still for 2 hours. Notify your doctor as soon as possible.  IF BLEEDING DOES NOT STOP OR THERE IS A LARGER AMOUNT OF BLEEDING OR SPURTING CALL 9-1-1 immediately.DO NOT drive yourself to the hospital.     Precautions - Lifting    DO NOT lift more than 5 pounds with affected arm for 48 hours     Precautions - Household Activities    Avoid excessive bending or movement of your wrist for 72 hours.  Do not subject hand/arm to any forceful movements for 24 hours, such as supporting weight when rising from a chair or bed.     Remove the band-aid on the puncture site after 24 hours and leave open to air. If minor oozing, you may apply a band-aid and remove after 12 hours.     Precautions - Active Sports Activities    DO NOT engage in vigorous exercise using your affected arm for 3 days after discharge.  This includes golf, tennis or swimming.     Precautions - Operating yard equipment or vehicles    Do not operate a chainsaw, lawnmower, motorcycle, or all-terrain vehicle for 48 hours after the procedure.     Precautions - Elective Dental Work    NO elective dental work for 6 weeks after receiving a stent.     Comfort and Pain Management - Bruising after Surgery    Expect mild tingling of hand and tenderness at the wrist puncture site for up to 3 days. You  may take Tylenol or a pain medicine recommended by your doctor.     Activity - Cardiac Rehab    You are encouraged to enroll in an Outpatient Cardiac Rehab program after discharge from the hospital.  Our Cardiac Rehab staff may visit briefly with you while you're in the hospital.  If they miss you, someone will contact you after you are home.     Return to Driving    Driving is NOT permitted for 24 hours after surgery     Return to work    You may return to work after 72 hours if you are feeling well and your job does not involve heavy lifting.     Shower / Bathing    You may shower on the day after your procedure.  DO NOT soak of wrist with the puncture site in water for 3 days to prevent infection. DO NOT take a tub bath or wash dishes for 3 days after the procedure     Dressing Removal    Remove the band-aid on the puncture site after 24 hours and leave open to air. If minor oozing, you may apply a band-aid and remove after 12 hours     Reason aspirin not prescribed from this order set     Reason Lipid Lowering Medications not prescribed from this order set    Defer to inpatient provider     Reason for your hospital stay    You were treated for chest pain     Follow-up and recommended labs and tests     Follow up with primary care provider, Jackson Fallon, within 7 days for hospital follow- up.  No follow up labs or test are needed.  Follow up with cardiology on discharge     Activity    Your activity upon discharge: activity as tolerated     Diet    Follow this diet upon discharge: Orders Placed This Encounter      Low Saturated Fat Na <2400 mg       Significant Results and Procedures   Most Recent 3 CBC's:  Recent Labs   Lab Test 01/10/24  2225 12/30/22  2020   WBC 6.4 9.1   HGB 13.2 13.9   MCV 86 90    251     Most Recent 3 BMP's:  Recent Labs   Lab Test 01/10/24  2225 12/30/22  2020    138   POTASSIUM 4.3 3.8   CHLORIDE 103 104   CO2 28 26   BUN 18.4 24   CR 0.62 0.74   ANIONGAP 7 8   GONZALEZ 9.4 9.3    * 107*   ,   Results for orders placed or performed during the hospital encounter of 01/11/24   XR Chest 2 Views    Narrative    EXAM: XR CHEST 2 VIEWS  LOCATION: [facility]  DATE: 1/11/2024    INDICATION: cp  COMPARISON: 12/11/2009      Impression    IMPRESSION: Negative chest.   Cardiac Catheterization    Narrative    1.  Coronary angiography notable for severe stenosis in the proximal LAD,   moderate disease in the RCA and left circumflex and what appears to be   100%  lesion in the proximal portion of the ramus intermedius branch.  2.  Successful PCI to the proximal LAD including deployment of a 3.0 x 28   mm Synergy XD JANAE    Interventional Cardiology Staff Attestation  I was scrubbed in and supervised the entire procedure. I agree with the   procedural description and findings per .  Dr. Torsten Delgado MD         Discharge Medications   Current Discharge Medication List        START taking these medications    Details   !! clopidogrel (PLAVIX) 300 MG TABS tablet Take 1 tablet (300 mg) by mouth once for 1 dose  Qty: 1 tablet, Refills: 0    Comments: Please take 300mg x1 dose on 1/12/24 and continue with 75mg daily starting 1/13/24.  Associated Diagnoses: Coronary artery disease involving native coronary artery of native heart, unspecified whether angina present       !! - Potential duplicate medications found. Please discuss with provider.        CONTINUE these medications which have CHANGED    Details   rosuvastatin (CRESTOR) 40 MG tablet Take 1 tablet (40 mg) by mouth at bedtime  Qty: 30 tablet, Refills: 0    Associated Diagnoses: Chest pain, unspecified type; Coronary artery disease involving native coronary artery of native heart, unspecified whether angina present           CONTINUE these medications which have NOT CHANGED    Details   Oral Electrolytes (THERMOTABS) TABS Take 1 tablet by mouth daily      potassium 99 MG TABS Take 1 tablet by mouth daily       !! clopidogrel (PLAVIX) 75 MG tablet Take 75 mg by mouth daily       !! - Potential duplicate medications found. Please discuss with provider.        Allergies   Allergies   Allergen Reactions    Aspirin Anaphylaxis

## 2024-01-12 NOTE — PROGRESS NOTES
[facility]  Cardiology Progress Note         Assessment and Plan:     UNSA  S/p PCI stent  lad   OF A LARGE RAMUS  Untreated genetic HLD    Pt notes morales  likely it is brilinta since just started  Pt had am brilinta today--will start plavix 300 tonite then 75 daily  pt reports ASA anaphylaxis but then said she doesn't know details of asa allergy since she was a child when it occurred    Will plan discharge today  See plavix above  Will see in office to discuss  pci but given likely no symptoms will need long discussion with pt if should proceed (I already discussed this today when I showed her the angio film)      Pt has appt with allergist to discuss asa use  Longer visit today  60 min to discuss meds, show cath and discuss plan  Ecg this am nl               Interval History:     No cp some morales              Medications:   Scheduled Meds:    ticagrelor  90 mg Oral Q12H     PRN Meds: acetaminophen **OR** acetaminophen, alum & mag hydroxide-simethicone, HOLD MEDICATION, hydrALAZINE, metoprolol, nitroGLYcerin, ondansetron **OR** ondansetron, Percutaneous Coronary Intervention orders placed (this is information for BPA alerting), reason aspirin not prescribed (intentional), BETA BLOCKER NOT PRESCRIBED, STATIN NOT PRESCRIBED         Physical Exam:   Blood pressure 118/79, pulse 73, temperature 98.6  F (37  C), temperature source Oral, resp. rate 18, height 1.524 m (5'), weight 64 kg (141 lb 3.2 oz), SpO2 96%.  Vitals:    24 0348 24 0500   Weight: 63.8 kg (140 lb 10.5 oz) 64 kg (141 lb 3.2 oz)     No intake or output data in the 24 hours ending 24 0910        Vital Sign Ranges  Temperature Temp  Av.7  F (37.1  C)  Min: 98.5  F (36.9  C)  Max: 99  F (37.2  C)   Blood pressure Systolic (24hrs), Av , Min:94 , Max:149        Diastolic (24hrs), Av, Min:56, Max:108      Pulse Pulse  Av.7  Min: 64  Max: 93   Respirations Resp  Av.9  Min: 11  Max: 35   Pulse  "oximetry SpO2  Av.7 %  Min: 96 %  Max: 98 %             Constitutional: Awake, alert, cooperative, no apparent distress         Skin: No rash, petechia, cyanosis       Neck: No thyromegaly or adenopathy       Lungs: clear       Cardiovasc: Rrr no murmur   RRA cath site ok       Abdomen: Normal bowel sounds, soft, non-distended, non-tender, no hepatomegaly       Neuro: Alert and oriented x3, non focal exam       Extremities: No edema       Other:             Data:     Recent Labs   Lab Test 01/10/24  2225 12/30/22  2020   WBC 6.4 9.1   HGB 13.2 13.9   MCV 86 90    251      Recent Labs   Lab Test 01/10/24  2225 12/30/22  2020    138   POTASSIUM 4.3 3.8   CHLORIDE 103 104   BUN 18.4 24   CR 0.62 0.74     Recent Labs   Lab 01/10/24  2225   *     Recent Labs   Lab Test 01/10/24  2225   ALT 35   AST 25     No components found for: \"TROPONINIES\"    Lab Results   Component Value Date    CHOL 204 2009     Lab Results   Component Value Date    HDL 37 2009     Lab Results   Component Value Date     2009     Lab Results   Component Value Date    TRIG 53 2009     No results found for: \"CHOLHDLRATIO\"     No results found for: \"TSH\"    "

## 2024-01-12 NOTE — DISCHARGE INSTRUCTIONS
Cardiac Angiogram Discharge Instructions - Radial    After you go home:    Have an adult stay with you until tomorrow.  Drink extra fluids for 2 days.  You may resume your normal diet.  No smoking       For 24 hours - due to the sedation you received:  Relax and take it easy.  Do NOT make any important or legal decisions.  Do NOT drive or operate machines at home or at work.  Do NOT drink alcohol.    Care of Wrist Puncture Site:    For the first 24 hrs - check the puncture site every 1-2 hours while awake.  It is normal to have soreness at the puncture site and mild tingling in your hand for up to 3 days.  Remove the bandaid after 24 hours. If there is minor oozing, apply another bandaid and remove it after 12 hours.  You may shower tomorrow.  Do NOT take a bath, or use a hot tub or pool for at least 3 days. Do NOT scrub the site. Do not use lotion or powder near the puncture site.           Activity:        For 2 days:   do not use your hand or arm to support your weight (such as rising from a chair)   do not bend your wrist (such as lifting a garage door).  do not lift more than 5 pounds or exercise your arm (such as tennis, golf or bowling).  Do NOT do any heavy activity such as exercise, lifting, or straining.     Bleeding:    If you start bleeding from the site in your wrist, sit down and press firmly on/above the site for 10 minutes.   Once bleeding stops, keep arm still for 2 hours.   Call [facility] as soon as you can.       Call 911 right away if you have heavy bleeding or bleeding that does not stop.      Medicines:    If you are taking an antiplatelet medication such as Plavix, do not stop taking it until you talk to your cardiologist.      Take your medications, including blood thinners, unless your provider tells you not to.    If you have stopped any medicines, check with your provider about when to restart them.    Follow Up Appointments:    Follow up with [facility] Nurse Practitioner at [facility] of patient preference in 7-10 days.    Call the clinic if:    You have a large or growing hard lump around the site.  The site is red, swollen, hot or tender.  Blood or fluid is draining from the site.  You have chills or a fever greater than 101 F (38 C).  Your arm feels numb, cool or changes color.  You have hives, a rash or unusual itching.  Any questions or concerns.          [facility] [place]:    [phone] UMP (7 days a week)

## 2024-01-12 NOTE — PROGRESS NOTES
[phone]:  A&O pleasant and cooperative. Up independent in room with assistance hooking up/removing blood pressure cuff. TR band, plan to remove by change of shift 2300. Headache, declines tylenol, icepack given.

## 2024-01-12 NOTE — PROGRESS NOTES
Aox4, VSS on RA. R Radial site CDI, CMS intact. TR has 14ml of air. Attempted to start removal process per order, started bleeding, 3ml returned.

## 2024-01-12 NOTE — PLAN OF CARE
A&OX4, RA, VSS, IVF infusing at 75 ml/hr. Up with SBA, Tele- SR. Complained of headache, declined tylenol. Slept intermittent

## 2024-01-12 NOTE — PROGRESS NOTES
VSS, no c/o pain, right radial site CDI, no hematoma, radial pulse intact.  Does c/o typical POTS symptoms that she has had in the past--was able to drink some gatorade and eat some bananas which made her feel better and are typical of doing so.  AVS, med rec, scripts and follow up appts all given and discussed in detail with patient and , all verbalized understanding of materials and all questions and concerns addressed at this time.  Pt had all belongings with her at time of discharge.

## 2024-01-14 ENCOUNTER — HOSPITAL ENCOUNTER (OUTPATIENT)
Facility: CLINIC | Age: 54
Setting detail: OBSERVATION
Discharge: HOME | End: 2024-01-15
Attending: EMERGENCY MEDICINE | Admitting: INTERNAL MEDICINE
Payer: COMMERCIAL

## 2024-01-14 ENCOUNTER — APPOINTMENT (OUTPATIENT)
Dept: GENERAL RADIOLOGY | Facility: CLINIC | Age: 54
End: 2024-01-14
Attending: EMERGENCY MEDICINE
Payer: COMMERCIAL

## 2024-01-14 ENCOUNTER — PATIENT OUTREACH (OUTPATIENT)
Dept: CARE COORDINATION | Facility: CLINIC | Age: 54
End: 2024-01-14

## 2024-01-14 ENCOUNTER — NURSE TRIAGE (OUTPATIENT)
Dept: NURSING | Facility: CLINIC | Age: 54
End: 2024-01-14
Payer: COMMERCIAL

## 2024-01-14 DIAGNOSIS — R07.9 CHEST PAIN, UNSPECIFIED TYPE: ICD-10-CM

## 2024-01-14 DIAGNOSIS — R79.89 ELEVATED TROPONIN: ICD-10-CM

## 2024-01-14 LAB
ANION GAP SERPL CALCULATED.3IONS-SCNC: 9 MMOL/L (ref 7–15)
ATRIAL RATE - MUSE: 99 BPM
BASOPHILS # BLD AUTO: 0.1 10E3/UL (ref 0–0.2)
BASOPHILS NFR BLD AUTO: 1 %
BUN SERPL-MCNC: 17.9 MG/DL (ref 6–20)
CALCIUM SERPL-MCNC: 10.1 MG/DL (ref 8.6–10)
CHLORIDE SERPL-SCNC: 101 MMOL/L (ref 98–107)
CREAT SERPL-MCNC: 0.76 MG/DL (ref 0.51–0.95)
DEPRECATED HCO3 PLAS-SCNC: 30 MMOL/L (ref 22–29)
DIASTOLIC BLOOD PRESSURE - MUSE: NORMAL MMHG
EGFRCR SERPLBLD CKD-EPI 2021: >90 ML/MIN/1.73M2
EOSINOPHIL # BLD AUTO: 0.1 10E3/UL (ref 0–0.7)
EOSINOPHIL NFR BLD AUTO: 1 %
ERYTHROCYTE [DISTWIDTH] IN BLOOD BY AUTOMATED COUNT: 11.9 % (ref 10–15)
FLUAV RNA SPEC QL NAA+PROBE: NEGATIVE
FLUBV RNA RESP QL NAA+PROBE: NEGATIVE
GLUCOSE SERPL-MCNC: 109 MG/DL (ref 70–99)
HCT VFR BLD AUTO: 43.8 % (ref 35–47)
HGB BLD-MCNC: 15 G/DL (ref 11.7–15.7)
HOLD SPECIMEN: NORMAL
HOLD SPECIMEN: NORMAL
IMM GRANULOCYTES # BLD: 0 10E3/UL
IMM GRANULOCYTES NFR BLD: 0 %
INTERPRETATION ECG - MUSE: NORMAL
LYMPHOCYTES # BLD AUTO: 2.2 10E3/UL (ref 0.8–5.3)
LYMPHOCYTES NFR BLD AUTO: 32 %
MCH RBC QN AUTO: 29.4 PG (ref 26.5–33)
MCHC RBC AUTO-ENTMCNC: 34.2 G/DL (ref 31.5–36.5)
MCV RBC AUTO: 86 FL (ref 78–100)
MONOCYTES # BLD AUTO: 0.5 10E3/UL (ref 0–1.3)
MONOCYTES NFR BLD AUTO: 7 %
NEUTROPHILS # BLD AUTO: 4.2 10E3/UL (ref 1.6–8.3)
NEUTROPHILS NFR BLD AUTO: 59 %
NRBC # BLD AUTO: 0 10E3/UL
NRBC BLD AUTO-RTO: 0 /100
P AXIS - MUSE: 50 DEGREES
PLATELET # BLD AUTO: 271 10E3/UL (ref 150–450)
POTASSIUM SERPL-SCNC: 3.8 MMOL/L (ref 3.4–5.3)
PR INTERVAL - MUSE: 192 MS
QRS DURATION - MUSE: 74 MS
QT - MUSE: 340 MS
QTC - MUSE: 436 MS
R AXIS - MUSE: 56 DEGREES
RBC # BLD AUTO: 5.1 10E6/UL (ref 3.8–5.2)
RSV RNA SPEC NAA+PROBE: NEGATIVE
SARS-COV-2 RNA RESP QL NAA+PROBE: NEGATIVE
SODIUM SERPL-SCNC: 140 MMOL/L (ref 135–145)
SYSTOLIC BLOOD PRESSURE - MUSE: NORMAL MMHG
T AXIS - MUSE: 53 DEGREES
TROPONIN T SERPL HS-MCNC: 105 NG/L
TROPONIN T SERPL HS-MCNC: 120 NG/L
TROPONIN T SERPL HS-MCNC: 121 NG/L
VENTRICULAR RATE- MUSE: 99 BPM
WBC # BLD AUTO: 7.1 10E3/UL (ref 4–11)

## 2024-01-14 PROCEDURE — 85025 COMPLETE CBC W/AUTO DIFF WBC: CPT | Performed by: EMERGENCY MEDICINE

## 2024-01-14 PROCEDURE — 87637 SARSCOV2&INF A&B&RSV AMP PRB: CPT | Performed by: EMERGENCY MEDICINE

## 2024-01-14 PROCEDURE — 82374 ASSAY BLOOD CARBON DIOXIDE: CPT | Performed by: EMERGENCY MEDICINE

## 2024-01-14 PROCEDURE — 71046 X-RAY EXAM CHEST 2 VIEWS: CPT

## 2024-01-14 PROCEDURE — 84484 ASSAY OF TROPONIN QUANT: CPT | Performed by: EMERGENCY MEDICINE

## 2024-01-14 PROCEDURE — 84484 ASSAY OF TROPONIN QUANT: CPT | Mod: 91 | Performed by: INTERNAL MEDICINE

## 2024-01-14 PROCEDURE — 250N000011 HC RX IP 250 OP 636: Performed by: EMERGENCY MEDICINE

## 2024-01-14 PROCEDURE — 250N000013 HC RX MED GY IP 250 OP 250 PS 637: Performed by: INTERNAL MEDICINE

## 2024-01-14 PROCEDURE — 36415 COLL VENOUS BLD VENIPUNCTURE: CPT | Performed by: INTERNAL MEDICINE

## 2024-01-14 PROCEDURE — G0378 HOSPITAL OBSERVATION PER HR: HCPCS

## 2024-01-14 PROCEDURE — 99285 EMERGENCY DEPT VISIT HI MDM: CPT | Mod: 25

## 2024-01-14 PROCEDURE — 96374 THER/PROPH/DIAG INJ IV PUSH: CPT

## 2024-01-14 PROCEDURE — 93005 ELECTROCARDIOGRAM TRACING: CPT

## 2024-01-14 PROCEDURE — 36415 COLL VENOUS BLD VENIPUNCTURE: CPT | Performed by: EMERGENCY MEDICINE

## 2024-01-14 PROCEDURE — 99223 1ST HOSP IP/OBS HIGH 75: CPT | Performed by: INTERNAL MEDICINE

## 2024-01-14 PROCEDURE — 250N000013 HC RX MED GY IP 250 OP 250 PS 637: Performed by: EMERGENCY MEDICINE

## 2024-01-14 PROCEDURE — 258N000003 HC RX IP 258 OP 636: Performed by: EMERGENCY MEDICINE

## 2024-01-14 RX ORDER — MAGNESIUM HYDROXIDE/ALUMINUM HYDROXICE/SIMETHICONE 120; 1200; 1200 MG/30ML; MG/30ML; MG/30ML
30 SUSPENSION ORAL EVERY 4 HOURS PRN
Status: DISCONTINUED | OUTPATIENT
Start: 2024-01-14 | End: 2024-01-15 | Stop reason: HOSPADM

## 2024-01-14 RX ORDER — ONDANSETRON 4 MG/1
4 TABLET, ORALLY DISINTEGRATING ORAL EVERY 6 HOURS PRN
Status: DISCONTINUED | OUTPATIENT
Start: 2024-01-14 | End: 2024-01-15 | Stop reason: HOSPADM

## 2024-01-14 RX ORDER — NITROGLYCERIN 0.4 MG/1
0.4 TABLET SUBLINGUAL EVERY 5 MIN PRN
Status: DISCONTINUED | OUTPATIENT
Start: 2024-01-14 | End: 2024-01-14

## 2024-01-14 RX ORDER — ACETAMINOPHEN 650 MG/1
650 SUPPOSITORY RECTAL EVERY 4 HOURS PRN
Status: DISCONTINUED | OUTPATIENT
Start: 2024-01-14 | End: 2024-01-15 | Stop reason: HOSPADM

## 2024-01-14 RX ORDER — CLOPIDOGREL BISULFATE 75 MG/1
75 TABLET ORAL DAILY
Status: DISCONTINUED | OUTPATIENT
Start: 2024-01-15 | End: 2024-01-15 | Stop reason: HOSPADM

## 2024-01-14 RX ORDER — ACETAMINOPHEN 325 MG/1
650 TABLET ORAL ONCE
Status: COMPLETED | OUTPATIENT
Start: 2024-01-14 | End: 2024-01-14

## 2024-01-14 RX ORDER — ONDANSETRON 2 MG/ML
4 INJECTION INTRAMUSCULAR; INTRAVENOUS EVERY 30 MIN PRN
Status: DISCONTINUED | OUTPATIENT
Start: 2024-01-14 | End: 2024-01-14

## 2024-01-14 RX ORDER — ACETAMINOPHEN 325 MG/1
650 TABLET ORAL EVERY 4 HOURS PRN
Status: DISCONTINUED | OUTPATIENT
Start: 2024-01-14 | End: 2024-01-15 | Stop reason: HOSPADM

## 2024-01-14 RX ORDER — ROSUVASTATIN CALCIUM 20 MG/1
40 TABLET, COATED ORAL AT BEDTIME
Status: DISCONTINUED | OUTPATIENT
Start: 2024-01-14 | End: 2024-01-15 | Stop reason: HOSPADM

## 2024-01-14 RX ORDER — NITROGLYCERIN 0.4 MG/1
0.4 TABLET SUBLINGUAL EVERY 5 MIN PRN
Status: DISCONTINUED | OUTPATIENT
Start: 2024-01-14 | End: 2024-01-15 | Stop reason: HOSPADM

## 2024-01-14 RX ORDER — ONDANSETRON 2 MG/ML
4 INJECTION INTRAMUSCULAR; INTRAVENOUS EVERY 6 HOURS PRN
Status: DISCONTINUED | OUTPATIENT
Start: 2024-01-14 | End: 2024-01-15 | Stop reason: HOSPADM

## 2024-01-14 RX ADMIN — ONDANSETRON 4 MG: 2 INJECTION INTRAMUSCULAR; INTRAVENOUS at 13:53

## 2024-01-14 RX ADMIN — ROSUVASTATIN CALCIUM 40 MG: 20 TABLET, FILM COATED ORAL at 21:43

## 2024-01-14 RX ADMIN — ACETAMINOPHEN 650 MG: 325 TABLET, FILM COATED ORAL at 20:22

## 2024-01-14 RX ADMIN — SODIUM CHLORIDE 1000 ML: 9 INJECTION, SOLUTION INTRAVENOUS at 13:53

## 2024-01-14 RX ADMIN — ACETAMINOPHEN 650 MG: 325 TABLET, FILM COATED ORAL at 14:39

## 2024-01-14 ASSESSMENT — ACTIVITIES OF DAILY LIVING (ADL)
ADLS_ACUITY_SCORE: 20
ADLS_ACUITY_SCORE: 20
ADLS_ACUITY_SCORE: 35
ADLS_ACUITY_SCORE: 33
ADLS_ACUITY_SCORE: 35
ADLS_ACUITY_SCORE: 35

## 2024-01-14 NOTE — PHARMACY-ADMISSION MEDICATION HISTORY
Pharmacist Admission Medication History    Admission medication history is complete. The information provided in this note is only as accurate as the sources available at the time of the update.    Information Source(s): Patient, Hospital records, and CareEverywhere/SureScripts via in-person    Pertinent Information:   -patient     Changes made to PTA medication list:  Added: None  Deleted: clopidogrel 300 mg (completed on 1/12)  Changed: Thermotabs (1 tab daily --> 1 TID PRN POTS symptoms), potassium (1 tab daily --> 1 tab BID PRN POTS symptoms)    Medication Affordability:  Not including over the counter (OTC) medications, was there a time in the past 3 months when you did not take your medications as prescribed because of cost?: No    Allergies reviewed with patient and updates made in EHR: yes, no changes    Medication History Completed By: Karen Joseph RPH 1/14/2024 5:22 PM    PTA Med List   Medication Sig Last Dose    clopidogrel (PLAVIX) 75 MG tablet Take 75 mg by mouth daily 1/14/2024    Oral Electrolytes (THERMOTABS) TABS Take 1 tablet by mouth 3 times daily as needed (POTS symptoms)  at PRN    potassium 99 MG TABS Take 1 tablet by mouth 2 times daily as needed (POTS symptoms)  at PRN    rosuvastatin (CRESTOR) 40 MG tablet Take 1 tablet (40 mg) by mouth at bedtime 1/13/2024

## 2024-01-14 NOTE — ED TRIAGE NOTES
Cardiac stent placed last Thursday here. Since getting home has headache, now has constant left sided CP that gets worse at times. Nausea and chills.

## 2024-01-14 NOTE — ED PROVIDER NOTES
"  History     Chief Complaint:  Chest Pain       HPI   Amalia Blackwood is a 53 year old female who presents with left-sided chest pain and headache that been present for about 2 days ever since she was discharged in the hospital.  She had a stent put in 3 days ago and was discharged 2 days ago, and is on Plavix and is doing well.  He states that the pressure-like chest pain she had in the center of her chest is gone and now she only has intermittent left-sided \"twinges\".  She also endorses a mild headache that has come and gone and gotten worse over the last 2 days.    She endorses nausea, no abdominal pain, no diarrhea, no numbness or tingling.      Independent Historian:   No    Review of External Notes: Yes I reviewed the patient's admission to the hospital on  of this year.  I also reviewed her last cardiology office visit note from 2024.      Allergies:  Aspirin     Medications:    clopidogrel (PLAVIX) 300 MG TABS tablet  clopidogrel (PLAVIX) 75 MG tablet  Oral Electrolytes (THERMOTABS) TABS  potassium 99 MG TABS  rosuvastatin (CRESTOR) 40 MG tablet        Past Medical History:    Past Medical History:   Diagnosis Date    Kidney stone        Past Surgical History:    Past Surgical History:   Procedure Laterality Date    GENITOURINARY SURGERY      CO INDUCED ABORTN BY D&C      Description: Surgically Induced ;  Recorded: 2009;    CO LAP,FULGURATE/EXCISE LESIONS      Description: Laparoscopy With Excision Of Oviduct Obstruction;  Recorded: 2008;        Family History:    family history is not on file.    Social History:   reports that she has never smoked. She has never used smokeless tobacco. She reports that she does not use drugs.  PCP: Jackson Fallon     Physical Exam   Patient Vitals for the past 24 hrs:   BP Temp Temp src Pulse Resp SpO2 Weight   24 1254 (!) 140/85 98.1  F (36.7  C) Temporal 108 16 98 % 63.5 kg (140 lb)        Physical Exam  Vitals: reviewed by " me  General: Pt seen on \[facility] [place] [facility]\"", [facility], cooperative, and alert to conversation  Eyes: Tracking well, clear conjunctiva BL  ENT: MMM, midline trachea.   Lungs: No tachypnea, no accessory muscle use. No respiratory distress.   CV: Rate as above  MSK: no joint effusion.  No evidence of trauma  Skin: No rash  Neuro: Clear speech and no facial droop.  Psych: Not RIS, no e/o AH/VH      Emergency Department Course     ECG results from 01/14/24   EKG 12-lead, tracing only     Value    Systolic Blood Pressure     Diastolic Blood Pressure     Ventricular Rate 99    Atrial Rate 99    MI Interval 192    QRS Duration 74        QTc 436    P Axis 50    R AXIS 56    T Axis 53    Interpretation ECG      Sinus rhythm  Normal ECG  When compared with ECG of 12-JAN-2024 07:15, (unconfirmed)  No significant change was found  Confirmed by GENERATED REPORT, COMPUTER ([id number]),  OLE BREEN ([id number]) on 1/14/2024 1:11:55 PM         Imaging:  XR Chest 2 Views   Final Result   IMPRESSION: Coronary artery stent/calcifications. No acute findings. The lungs are clear and there are no pleural effusions. Normal heart size. No vascular congestion or pulmonary edema. Spinal degenerative changes.         Report per radiology    Laboratory:  Labs Ordered and Resulted from Time of ED Arrival to Time of ED Departure   BASIC METABOLIC PANEL - Abnormal       Result Value    Sodium 140      Potassium 3.8      Chloride 101      Carbon Dioxide (CO2) 30 (*)     Anion Gap 9      Urea Nitrogen 17.9      Creatinine 0.76      GFR Estimate >90      Calcium 10.1 (*)     Glucose 109 (*)    TROPONIN T, HIGH SENSITIVITY - Abnormal    Troponin T, High Sensitivity 120 (*)    INFLUENZA A/B, RSV, & SARS-COV2 PCR - Normal    Influenza A PCR Negative      Influenza B PCR Negative      RSV PCR Negative      SARS CoV2 PCR Negative     CBC WITH PLATELETS AND DIFFERENTIAL    WBC Count 7.1      RBC Count 5.10      Hemoglobin 15.0      Hematocrit 43.8      MCV 86       MCH 29.4      MCHC 34.2      RDW 11.9      Platelet Count 271      % Neutrophils 59      % Lymphocytes 32      % Monocytes 7      % Eosinophils 1      % Basophils 1      % Immature Granulocytes 0      NRBCs per 100 WBC 0      Absolute Neutrophils 4.2      Absolute Lymphocytes 2.2      Absolute Monocytes 0.5      Absolute Eosinophils 0.1      Absolute Basophils 0.1      Absolute Immature Granulocytes 0.0      Absolute NRBCs 0.0     TROPONIN T, HIGH SENSITIVITY          Emergency Department Course & Assessments:      Interventions:  Medications   sodium chloride 0.9% BOLUS 1,000 mL (has no administration in time range)   ondansetron (ZOFRAN) injection 4 mg (has no administration in time range)          Independent Interpretation (X-rays, CTs, rhythm strip):  Yes have independently reviewed the patient's chest x-ray, no obvious pneumothorax noted    Consultations/Discussion of Management or Tests:  Yes I spoke with the cardiologist on-call, who agreed with admission and cardiology consult in the setting of having chest pain so proximate to a cath.  No heparinization unless second Trope goes high.  Patient on Plavix.           Social Determinants of Health affecting care:   Stress/Adjustment Disorders      Disposition:  The patient was admitted to the hospital under the care of Dr. Orozco.     Impression & Plan        Medical Decision Making:  This is a very pleasant 53-year-old female who presents the emergency room with chest pain.  Does sound like she had a different type of chest pain when she had her cath done, but nonetheless she still does have some chest pain here and elevated troponin.  I suspect the troponin is elevated simply as a remnant to the cath procedure, but if it is uptrending we will start heparinization.  Patient's pain is minimal at this point, but not 0, and it sounds like it did feel better with nitro.  I do think the patient should be admitted and seen by cardiology, and I spoke with the  cardiologist on-call who agreed with this plan.  The hospitalist team has generously agreed to accept primary care of this patient, and the patient will be monitored very carefully until next inpatient telemetry bed is available.  No evidence of pneumonia, differential is still wide at this point, no tachypnea or pleurisy or hypoxia or evidence of PE.    Diagnosis:    ICD-10-CM    1. Chest pain, unspecified type  R07.9       2. Elevated troponin  R79.89            1/14/2024   Torsten Aponte*        Torsten Aponte MD  01/14/24 [id number]

## 2024-01-14 NOTE — ED NOTES
"[facility]  ED Nurse Handoff Report    ED Chief complaint: Chest Pain      ED Diagnosis:   Final diagnoses:   Chest pain, unspecified type   Elevated troponin       Code Status: To be addressed by admitting provider    Allergies:   Allergies   Allergen Reactions    Aspirin Anaphylaxis       Patient Story: Pt presented with mid-sternal CP that radiates to L arm. Described as \"sore\" with brief, intermittent exacerbations in pain. Reported feeling SOB at home + HA at home. Recent angio with stent placement here.  Focused Assessment:  Alert and oriented x 4. Intermittent CP, mostly relieved without intervention. Denies SOB. HA improved with APAP. Trop elevated. Up with SBA-Ind. VSS RA.     Treatments and/or interventions provided: Labs, meds (see MAR), IVf's  Patient's response to treatments and/or interventions: TBD    To be done/followed up on inpatient unit:  Admission orders    Does this patient have any cognitive concerns?:  Alert and oriented x 4    Activity level - Baseline/Home:  Independent  Activity Level - Current:   Stand with Assist    Patient's Preferred language: English   Needed?: No    Isolation: None  Infection: Not Applicable  Patient tested for COVID 19 prior to admission: NO  Bariatric?: No    Vital Signs:   Vitals:    01/14/24 1455 01/14/24 1510 01/14/24 1545 01/14/24 1600   BP: 138/81  128/84    BP Location:       Patient Position:       Cuff Size:       Pulse: 101 105 99    Resp: (!) 9 13 19 16   Temp:       TempSrc:       SpO2: 100% 98% 99%    Weight:           Cardiac Rhythm:Cardiac Rhythm: Normal sinus rhythm    Was the PSS-3 completed:   Yes  What interventions are required if any?               Family Comments: Pt notified family  OBS brochure/video discussed/provided to patient/family: N/A              Name of person given brochure if not patient: na              Relationship to patient: na    For the majority of the shift this patient's behavior was Green. "   Behavioral interventions performed were na.    ED NURSE PHONE NUMBER: [phone]

## 2024-01-14 NOTE — ED NOTES
Reported another brief episode of CP, mid-sternal 4-5/10, but relieved without intervention after 1 minute.

## 2024-01-14 NOTE — H&P
"[facility]    History and Physical - Hospitalist Service       Date of Admission:  1/14/2024    Assessment & Plan      Amalia Blackwood is a 53 year old female admitted on 1/14/2024.   Amalia Blackwood is a 53 year old female with a past medical history of coronary artery disease s/p recent coronary angiogram and a stent placed to proximal LAD, dyslipidemia and POTS who presented to ER for evaluation of chest pain and headache.    # Chest pain  # Coronary artery disease status post recent stent placed to proximal LAD  # Dyslipidemia  # Moderate disease in the RCA and left circumflex, 100%  lesion in the proximal portion of the ramus intermedius branch  -As above she started having chest pain sometime in December she had an abnormal stress test as outpatient  -[facility] from 01/11-1/12/24  -Underwent coronary angiogram and had a stent placed to proximal LAD; found to have moderate disease of RCA and left circumflex and the 1%  lesion in proximal portion of the ramus intermedius branch  -Apparently allergic to aspirin (in childhood)\  -Started on Brilinta post angiogram but this was discontinued due to side effects of shortness of breath   -Switched to Plavix at the time of the discharge; continued on the PTA Crestor at the time of the discharge  -States that she continued to have left-sided chest \"soreness\" since she went home; chest pain got worse at home so she decided to come to ER  -Reported some new headache  -EKG with no ischemic changes  -Initial troponin elevated at 120 but not when expected after her angiogram, repeat troponin down to 105  -Admit under observational status  -Continue PTA Plavix and Crestor  -Telemetry  -Serial troponins  -Echocardiogram  -Cardiology consult  -Of note she is planning to go to see an allergist to be tested to see if she still has aspirin allergy.    # h/o POST  -seems well-controlled when she exercises regularly and takes a supplemental p.o. " electrolytes          Diet:  Regular diet  DVT Prophylaxis: Ambulate every shift  Coates Catheter: Not present  Lines: None     Cardiac Monitoring: None  Code Status:  Full code    Clinically Significant Risk Factors Present on Admission                # Drug Induced Platelet Defect: home medication list includes an antiplatelet medication        # Overweight: Estimated body mass index is 27.34 kg/m  as calculated from the following:    Height as of 1/11/24: 1.524 m (5').    Weight as of this encounter: 63.5 kg (140 lb).              Disposition Plan      Expected Discharge Date: 01/15/2024                  Paula Orozco MD  Hospitalist Service  [facility]  Securely message with [employer] (more info)  Text page via [facility] Paging/Directory     ______________________________________________________________________    Chief Complaint   Chest pain and headache.    History is obtained from the patient who is a good historian; I did discuss with ER attending Dr. Aponte.    History of Present Illness   Amalia Blackwood is a 53 year old female with a past medical history of coronary artery disease s/p recent coronary angiogram and a stent placed to proximal LAD, dyslipidemia and POTS who presented to ER for evaluation of chest pain and headache.  Patient was recently admitted to [facility] from from 1/11-1/12/2024.  In December 2023 she started having some chest pain which prompted a stress test which was done on 1/4/2024 and was abnormal.  Presented to [facility] on 1/11/24.  She was seen by cardiology and underwent a coronary angiogram that was notable for severe stenosis in the proximal LAD, moderate disease in the RCA and left circumflex and what appears to be 100%  lesion in the proximal portion of the ramus intermedius branch; successful PCI and JANAE to the proximal LAD.  She was started on Plavix.  She apparently has allergy to aspirin (childhood).  She  "was supposed to follow-up with allergist to discuss aspirin allergy    Patient states that when she went home on Friday she was still having some no chest soreness that got worse after she went home.  She reports that that she feels pain on left side of the chest that is described as \" twinges\" and \"stabbing ' that the radiating middle of her chest and it feels like \"pressure\".  She feels that that the the pain is somehow similar to her recent chest pain although at that time was more on the left side of her chest.  She also reports new headache stated on the back of her head and frontal headache.  She states that usually she does not have headaches.  Upon further questioning she reported some nausea at home but no vomiting.  She felt \"winded' when she was having chest pain.  Denies abdominal pain, no diarrhea, no constipation, no leg swellings.  She called the cardiology nurse and described her symptoms and she was advised to come to ER for further evaluation.    In ER she was seen by Dr. Aponte.  Vital signs:  Temp: 98.1  F (36.7  C) Temp src: Temporal BP: 128/84 Pulse: 99   Resp: 16 SpO2: 99 % O2 Device: None (Room air)     Weight: 63.5 kg (140 lb)  Estimated body mass index is 27.34 kg/m  as calculated from the following:    Height as of 1/11/24: 1.524 m (5').    Weight as of this encounter: 63.5 kg (140 lb).     CBC RESULTS:   Recent Labs   Lab Test 01/14/24  1308   WBC 7.1   RBC 5.10   HGB 15.0   HCT 43.8   MCV 86   MCH 29.4   MCHC 34.2   RDW 11.9         Last Comprehensive Metabolic Panel:  Lab Results   Component Value Date     01/14/2024    POTASSIUM 3.8 01/14/2024    CHLORIDE 101 01/14/2024    CO2 30 (H) 01/14/2024    ANIONGAP 9 01/14/2024     (H) 01/14/2024    BUN 17.9 01/14/2024    CR 0.76 01/14/2024    GFRESTIMATED >90 01/14/2024    GONZALEZ 10.1 (H) 01/14/2024     Initial troponin elevated at 120 (not unexpected after her recent cardiac catheterization; repeat troponin 105.  She " tested negative for influenza a and B and COVID-19 infection.  Chest x-ray-no infiltrates, no vascular congestion    EKG-normal sinus rhythm, no ischemic changes.  She was given Tylenol 1000 mg p.o. with improvement of her headache  Nitroglycerin sublingual was ordered but not given as patient reported improvement of her chest pain.    ER attending discussed with cardiology on-call who recommended her to be admitted under observational status for further monitoring.      Past Medical History    Past Medical History:   Diagnosis Date    Kidney stone        Past Surgical History   Past Surgical History:   Procedure Laterality Date    GENITOURINARY SURGERY      IA INDUCED ABORTN BY D&C      Description: Surgically Induced ;  Recorded: 2009;    IA LAP,FULGURATE/EXCISE LESIONS      Description: Laparoscopy With Excision Of Oviduct Obstruction;  Recorded: 2008;       Prior to Admission Medications   Prior to Admission Medications   Prescriptions Last Dose Informant Patient Reported? Taking?   Oral Electrolytes (THERMOTABS) TABS   Yes No   Sig: Take 1 tablet by mouth daily   clopidogrel (PLAVIX) 300 MG TABS tablet   No No   Sig: Take 1 tablet (300 mg) by mouth once for 1 dose   clopidogrel (PLAVIX) 75 MG tablet   Yes No   Sig: Take 75 mg by mouth daily   potassium 99 MG TABS   Yes No   Sig: Take 1 tablet by mouth daily   rosuvastatin (CRESTOR) 40 MG tablet   No No   Sig: Take 1 tablet (40 mg) by mouth at bedtime      Facility-Administered Medications: None        Review of Systems    The 10 point Review of Systems is negative other than noted in the HPI or here.     Social History   I have reviewed this patient's social history and updated it with pertinent information if needed.  Social History     Tobacco Use    Smoking status: Never    Smokeless tobacco: Never   Substance Use Topics    Drug use: Never         Allergies   Allergies   Allergen Reactions    Aspirin Anaphylaxis     24: pt reports  "\"as a child\"        Physical Exam   Vital Signs: Temp: 98.1  F (36.7  C) Temp src: Temporal BP: 128/84 Pulse: 99   Resp: 16 SpO2: 99 % O2 Device: None (Room air)    Weight: 140 lbs 0 oz    General Appearance: Awake/alert/no acute distress, eating dinner  Respiratory: Bilateral air entry, no wheezing, no rales, no crackles  Cardiovascular: S1-S2, RRR, no murmurs, no rubs  GI: Abdomen is soft, nontender, bowel sounds are present  Skin: No rashes, no cyanosis  Neuro: AAOx3, no focal neurological deficits  Extrem: no leg swelling    Medical Decision Making       MANAGEMENT DISCUSSED with the following over the past 24 hours: The patient, her , ER attending   NOTE(S)/MEDICAL RECORDS REVIEWED over the past 24 hours: Prior hospitalization notes, prior cardiology notes  Tests REVIEWED in the past 24 hours:  - BMP  - CBC  - Troponin  Tests personally interpreted in the past 24 hours:  - EKG tracing showing no ischemic changes  - CHEST XRAY showing as above       Data     I have personally reviewed the following data over the past 24 hrs:    7.1  \   15.0   / 271     140 101 17.9 /  109 (H)   3.8 30 (H) 0.76 \     Trop: 105 (HH) BNP: N/A       Imaging results reviewed over the past 24 hrs:   Recent Results (from the past 24 hour(s))   XR Chest 2 Views    Narrative    EXAM: XR CHEST 2 VIEWS  LOCATION: [facility]  DATE: 1/14/2024    INDICATION: Chest pain  COMPARISON: X-ray 01/11/2024      Impression    IMPRESSION: Coronary artery stent/calcifications. No acute findings. The lungs are clear and there are no pleural effusions. Normal heart size. No vascular congestion or pulmonary edema. Spinal degenerative changes.     "

## 2024-01-14 NOTE — TELEPHONE ENCOUNTER
"Nurse Triage SBAR    Is this a 2nd Level Triage? NO    Situation: Pt calling with new onset SOB and worsening chest pain.    Background: Was just discharged on Friday from [facility]. S/p coronary angiogram and stent placement. Says she hasn't quite felt right since she got home from the hospital.     Assessment: Says her pulse is elevated (in the 90s) and she feels like she has to take \"deep breaths\". Feels a little short of breath. Has a metal taste in her mouth. Has a moderate to severe headache that comes and goes. Says she feels like there is \"blood leaving her head\" when she lays down. Feels cold sometimes and is shivering. Chest pain comes for a minute or two then goes away for a minute or two.     Protocol Recommended Disposition:   Go to ED now.    Recommendation: Recommend pt go back to [facility] for evaluation. Should have her  drive her. If symptoms suddenly worsen should call 911. Pt verbalized understanding and in agreement with disposition.    Tabitha Rosado, RN, BSN  [facility]   Triage Nurse Advisor     Reason for Disposition   [1] Chest pain (or \"angina\") comes and goes AND [2] is happening more often (increasing in frequency) or getting worse (increasing in severity)  (Exception: Chest pains that last only a few seconds.)    Additional Information   Negative: Followed a chest injury   Negative: SEVERE difficulty breathing (e.g., struggling for each breath, speaks in single words)   Negative: Difficult to awaken or acting confused (e.g., disoriented, slurred speech)   Negative: Shock suspected (e.g., cold/pale/clammy skin, too weak to stand, low BP, rapid pulse)   Negative: Passed out (i.e., lost consciousness, collapsed and was not responding)   Negative: Chest pain lasting longer than 5 minutes and ANY of the following:    history of heart disease  (i.e., heart attack, bypass surgery, angina, angioplasty, CHF; not just a heart murmur)    described as crushing, pressure-like, or " heavy    age > 50    age > 30 AND at least one cardiac risk factor (i.e., hypertension, diabetes, obesity, smoker or strong family history of heart disease)    not relieved with nitroglycerin   Negative: Heart beating < 50 beats per minute OR > 140 beats per minute   Negative: Visible sweat on face or sweat dripping down face   Negative: Sounds like a life-threatening emergency to the triager   Negative: SEVERE chest pain    Protocols used: Chest Pain-A-AH

## 2024-01-14 NOTE — PROGRESS NOTES
[facility]: [facility]    Background: Transitional Care Management program identified per system criteria and reviewed by [facility] team for possible outreach.    Assessment: Upon chart review, [facility] Team member will not proceed with patient outreach related to this episode of Transitional Care Management program due to reason below:    Patient has presented to Emergency Department, been readmitted to hospital, or transferred to another hospital.    Plan: Transitional Care Management episode addressed appropriately per reason noted above.      Ann Hurd RN  [facility]    *Connected Care Resource Team does NOT follow patient ongoing. Referrals are identified based on internal discharge reports and the outreach is to ensure patient has an understanding of their discharge instructions.

## 2024-01-15 ENCOUNTER — APPOINTMENT (OUTPATIENT)
Dept: CARDIOLOGY | Facility: CLINIC | Age: 54
End: 2024-01-15
Attending: INTERNAL MEDICINE
Payer: COMMERCIAL

## 2024-01-15 VITALS
DIASTOLIC BLOOD PRESSURE: 79 MMHG | BODY MASS INDEX: 27.27 KG/M2 | HEART RATE: 83 BPM | OXYGEN SATURATION: 95 % | TEMPERATURE: 98.3 F | RESPIRATION RATE: 18 BRPM | SYSTOLIC BLOOD PRESSURE: 118 MMHG | WEIGHT: 138.9 LBS | HEIGHT: 60 IN

## 2024-01-15 LAB
ANION GAP SERPL CALCULATED.3IONS-SCNC: 8 MMOL/L (ref 7–15)
APO A-I SERPL-MCNC: 6 MG/DL
BUN SERPL-MCNC: 18.2 MG/DL (ref 6–20)
CALCIUM SERPL-MCNC: 9.7 MG/DL (ref 8.6–10)
CHLORIDE SERPL-SCNC: 105 MMOL/L (ref 98–107)
CREAT SERPL-MCNC: 0.73 MG/DL (ref 0.51–0.95)
DEPRECATED HCO3 PLAS-SCNC: 27 MMOL/L (ref 22–29)
EGFRCR SERPLBLD CKD-EPI 2021: >90 ML/MIN/1.73M2
GLUCOSE SERPL-MCNC: 92 MG/DL (ref 70–99)
LVEF ECHO: NORMAL
POTASSIUM SERPL-SCNC: 4.3 MMOL/L (ref 3.4–5.3)
SODIUM SERPL-SCNC: 140 MMOL/L (ref 135–145)

## 2024-01-15 PROCEDURE — 93306 TTE W/DOPPLER COMPLETE: CPT

## 2024-01-15 PROCEDURE — 36415 COLL VENOUS BLD VENIPUNCTURE: CPT | Performed by: INTERNAL MEDICINE

## 2024-01-15 PROCEDURE — G0378 HOSPITAL OBSERVATION PER HR: HCPCS

## 2024-01-15 PROCEDURE — 93325 DOPPLER ECHO COLOR FLOW MAPG: CPT | Mod: 26 | Performed by: INTERNAL MEDICINE

## 2024-01-15 PROCEDURE — 250N000013 HC RX MED GY IP 250 OP 250 PS 637: Performed by: INTERNAL MEDICINE

## 2024-01-15 PROCEDURE — 80048 BASIC METABOLIC PNL TOTAL CA: CPT | Performed by: INTERNAL MEDICINE

## 2024-01-15 PROCEDURE — 99222 1ST HOSP IP/OBS MODERATE 55: CPT | Mod: 25 | Performed by: INTERNAL MEDICINE

## 2024-01-15 PROCEDURE — 83695 ASSAY OF LIPOPROTEIN(A): CPT | Performed by: INTERNAL MEDICINE

## 2024-01-15 PROCEDURE — 93350 STRESS TTE ONLY: CPT | Mod: 26 | Performed by: INTERNAL MEDICINE

## 2024-01-15 PROCEDURE — 93016 CV STRESS TEST SUPVJ ONLY: CPT | Performed by: INTERNAL MEDICINE

## 2024-01-15 PROCEDURE — 93306 TTE W/DOPPLER COMPLETE: CPT | Mod: 26 | Performed by: INTERNAL MEDICINE

## 2024-01-15 PROCEDURE — 255N000002 HC RX 255 OP 636: Performed by: INTERNAL MEDICINE

## 2024-01-15 PROCEDURE — 999N000208 ECHO STRESS ECHOCARDIOGRAM

## 2024-01-15 PROCEDURE — 93321 DOPPLER ECHO F-UP/LMTD STD: CPT | Mod: 26 | Performed by: INTERNAL MEDICINE

## 2024-01-15 PROCEDURE — 93018 CV STRESS TEST I&R ONLY: CPT | Performed by: INTERNAL MEDICINE

## 2024-01-15 PROCEDURE — 99239 HOSP IP/OBS DSCHRG MGMT >30: CPT | Performed by: INTERNAL MEDICINE

## 2024-01-15 RX ORDER — METOPROLOL TARTRATE 25 MG/1
12.5 TABLET, FILM COATED ORAL 2 TIMES DAILY
Qty: 30 TABLET | Refills: 0 | Status: SHIPPED | OUTPATIENT
Start: 2024-01-15 | End: 2024-01-18

## 2024-01-15 RX ORDER — NITROGLYCERIN 0.4 MG/1
TABLET SUBLINGUAL
Qty: 25 TABLET | Refills: 0 | Status: SHIPPED | OUTPATIENT
Start: 2024-01-15

## 2024-01-15 RX ORDER — METOPROLOL TARTRATE 25 MG/1
25 TABLET, FILM COATED ORAL 2 TIMES DAILY
Status: DISCONTINUED | OUTPATIENT
Start: 2024-01-15 | End: 2024-01-15

## 2024-01-15 RX ADMIN — HUMAN ALBUMIN MICROSPHERES AND PERFLUTREN 9 ML: 10; .22 INJECTION, SOLUTION INTRAVENOUS at 11:04

## 2024-01-15 RX ADMIN — CLOPIDOGREL BISULFATE 75 MG: 75 TABLET ORAL at 13:21

## 2024-01-15 ASSESSMENT — ACTIVITIES OF DAILY LIVING (ADL)
ADLS_ACUITY_SCORE: 20

## 2024-01-15 NOTE — PROGRESS NOTES
RECEIVING UNIT ED HANDOFF REVIEW    ED Nurse Handoff Report was reviewed by: Yesenia Alas RN on January 14, 2024 at 7:35 PM

## 2024-01-15 NOTE — PLAN OF CARE
1945-0700: A&Ox4. VSS on RA. Tele SR. Reports very mild 3/10 left anterior chest pain, sore and intermittent. Serial trops trending down. Denies shortness of breath, dizziness, lightheaded or nausea. Reports posterior headache, given prn tylenol x1. Reports feeling fatigued. Up independently. Obs status. NPO @ 0000. Plan for cards consult and echo 1-15.

## 2024-01-15 NOTE — DISCHARGE SUMMARY
[facility]  Hospitalist Discharge Summary      Date of Admission:  1/14/2024  Date of Discharge:  1/15/2024  Discharging Provider: Allison Mccormick MD  Discharge Service: Hospitalist Service    Discharge Diagnoses   Chest pain  CAD S/p recent stent placed to proximal LAD  Dyslipidemia  Moderate disease in the RCA and left circumflex, 100%  lesion in the proximal portion of the ramus intermedius branch  H/o POTS, stable    Clinically Significant Risk Factors     # Overweight: Estimated body mass index is 27.13 kg/m  as calculated from the following:    Height as of this encounter: 1.524 m (5').    Weight as of this encounter: 63 kg (138 lb 14.4 oz).       Follow-ups Needed After Discharge   Follow-up Appointments     Follow-up and recommended labs and tests       Follow up with primary care provider, Jackson Fallon, within 7 days for   hospital follow- up.    Follow up with your cardiologist.            Unresulted Labs Ordered in the Past 30 Days of this Admission       Date and Time Order Name Status Description    1/15/2024  2:56 PM Lipoprotein (a) In process         These results will be followed up by primary care doctor and cardiologist.    Discharge Disposition   Discharged to home  Condition at discharge: Good    Hospital Course      Amalia Blackwood is a 53 year old female admitted on 1/14/2024.   Amalia Blackwood is a 53 year old female with a past medical history of coronary artery disease s/p recent coronary angiogram and a stent placed to proximal LAD, dyslipidemia and POTS who presented to ER for evaluation of chest pain and headache.    Chest pain  CAD S/p recent stent placed to proximal LAD  Dyslipidemia  Moderate disease in the RCA and left circumflex, 100%  lesion in the proximal portion of the ramus intermedius branch  * As above she started having chest pain sometime in December she had an abnormal stress test as outpatient  * Ad[facility] to [facility] from 01/11-1/12/24: Underwent  "coronary angiogram and had a stent placed to proximal LAD; found to have moderate disease of RCA and left circumflex and the 1%  lesion in proximal portion of the ramus intermedius branch  * Apparently allergic to aspirin (in childhood)  * Started on Brilinta post angiogram but this was discontinued due to side effects of shortness of breath   * Switched to Plavix at the time of the discharge; continued on the PTA Crestor at the time of the discharge  * States that she continued to have left-sided chest \"soreness\" since she went home. She then developed central chest pressure which was more severe while talking to the cardiology nurse who advised her to come in.   * EKG with no ischemic changes.   * Troponin mildly elevated around 120 and remained flat. May be secondary to recent angiogram.  * Central chest pressure resolved during her stay.      Appreciate Cardiology consult. Dr. Jenkins discussed further evaluation with echocardiogram stress test versus angiogram.  Patient opted for noninvasive approach as her chest pain had already resolved.  Echocardiogram and stress echocardiogram were both normal (reports below). As above, troponins remain flat  Given her family history of coronary artery disease and hyperlipidemia, cardiology sent a LP(a) which is pending at this time and can be reviewed with her cardiologist with follow-up  Given her residual CAD, cardiology recommended initiation of metoprolol 12.5 mg BID and as needed SL nitroglycerin. Discussed rationale and potential side effects with patient at discharge. She agreed with this plan.   Continue PTA Plavix and Crestor.  She is not on aspirin childhood history of aspirin allergy. Of note she is planning to go to see an allergist to be tested to see if she still has aspirin allergy.    H/o POTS  * Well-controlled when she exercises regularly and takes a supplemental p.o. electrolytes.    Consultations This Hospital Stay   CARDIOLOGY IP CONSULT    Code Status "   Full Code    Time Spent on this Encounter   I, Allison Mccormick MD, personally saw the patient today and spent greater than 30 minutes discharging this patient.       Allsion Mccormick MD  [facility] HEART CARE  [facility] [address]  [place] [id number]  Phone: [phone]  ______________________________________________________________________    Physical Exam   Vital Signs: Temp: 98  F (36.7  C) Temp src: Oral BP: 113/80 Pulse: 81   Resp: 16 SpO2: 95 % O2 Device: None (Room air)    Weight: 138 lbs 14.4 oz  Constitutional:  NAD,   Neuropsyche:  alert and oriented, answers questions appropriately. Speech normal, face symmetric and moving all 4 extremities without gross focal neurological deficit.   Respiratory:  Breathing comfortably, good air exchange, no wheezes, no crackles.   Cardiovascular:  Regular rate and rhythm, no edema.  GI:  soft, NT/ND, BS normal  Skin/Integumen:  No acute rash or sign of bleeding.          Primary Care Physician   Jackson Fallon    Discharge Orders      Follow-up and recommended labs and tests     Follow up with primary care provider, Jackson Fallon, within 7 days for hospital follow- up.    Follow up with your cardiologist.     Activity    Your activity upon discharge: activity as tolerated     Reason for your hospital stay    You are admitted with episode of chest pain and elevated heart rates.  Your cardiac enzymes were mildly elevated but remained flat.  To further evaluate this, you underwent an echocardiogram as well as a stress echocardiogram test. Both were unremarkable. You have good heart function there was no evidence of low blood flow state on these tests. Cardiology was involved in your care and recommended no further evaluation at this time.  As discussed, we started a new medication called metoprolol twice daily, as well as nitroglycerin to use as needed for chest pain.  A lipoprotein(a) test was obtained due to your family history of  heart disease and hypercholesterolemia.  Results are pending at this time.  Please follow-up with cardiologist and this test can be reviewed at this time.     Diet    Mediterranean diet.       Significant Results and Procedures   Most Recent 3 CBC's:  Recent Labs   Lab Test 24  1308 01/10/24  2225 22   WBC 7.1 6.4 9.1   HGB 15.0 13.2 13.9   MCV 86 86 90    241 251     Most Recent 3 BMP's:  Recent Labs   Lab Test 01/15/24  0621 24  1308 01/10/24  2225    140 138   POTASSIUM 4.3 3.8 4.3   CHLORIDE 105 101 103   CO2 27 30* 28   BUN 18.2 17.9 18.4   CR 0.73 0.76 0.62   ANIONGAP 8 9 7   GONZALEZ 9.7 10.1* 9.4   GLC 92 109* 118*     Most Recent 2 LFT's:  Recent Labs   Lab Test 01/10/24  2225   AST 25   ALT 35   ALKPHOS 122   BILITOTAL 0.3     Recent Labs   Lab 24  2315 24  1628 24  1308   CTROPT 121* 105* 120*       Results for orders placed or performed during the hospital encounter of 24   XR Chest 2 Views    Narrative    EXAM: XR CHEST 2 VIEWS  LOCATION: [facility]  DATE: 2024    INDICATION: Chest pain  COMPARISON: X-ray 2024      Impression    IMPRESSION: Coronary artery stent/calcifications. No acute findings. The lungs are clear and there are no pleural effusions. Normal heart size. No vascular congestion or pulmonary edema. Spinal degenerative changes.   Echocardiogram Complete     Value    LVEF  55-60%    Narrative    [id number]  [id number]  [id number]  [id number]^JESUS^DESTINI^BE     [facility]  Echocardiography Laboratory  [address] [place] [address]     Name: NGHIA BRAVO  MRN: [id number]  : 1970  Study Date: 01/15/2024 10:38 AM  Age: 53 yrs  Gender: Female  Patient Location: [facility]  Reason For Study: Chest Pain, Chest Pressure, Chest Tightness  Ordering Physician: DESTINI LEE  Referring Physician: Jackson Fallon  Performed By: Cari Lagunas     BSA: 1.6 m2  Height: 60  in  Weight: 136 lb  HR: 84  BP: 128/84 mmHg  ______________________________________________________________________________  Procedure  Complete Portable Echo Adult. Optison (NDC #3331-1597) given intravenously.  ______________________________________________________________________________  Interpretation Summary     Left ventricular systolic function is normal.  The visual ejection fraction is 55-60%.  No regional wall motion abnormalities noted.  The study was technically adequate. There is no comparison study available.  ______________________________________________________________________________  Left Ventricle  The left ventricle is normal in size. There is normal left ventricular wall  thickness. Left ventricular systolic function is normal. The visual ejection  fraction is 55-60%. Diastolic Doppler findings (E/E' ratio and/or other  parameters) suggest left ventricular filling pressures are indeterminate. No  regional wall motion abnormalities noted.     Right Ventricle  The right ventricle is normal size. The right ventricular systolic function is  normal.     Atria  Normal left atrial size. Right atrial size is normal.     Mitral Valve  There is trace mitral regurgitation.     Tricuspid Valve  There is trace tricuspid regurgitation. Right ventricular systolic pressure  could not be approximated due to inadequate tricuspid regurgitation. IVC  diameter <2.1 cm collapsing >50% with sniff suggests a normal RA pressure of 3  mmHg.     Aortic Valve  The aortic valve is trileaflet. No aortic regurgitation is present. No aortic  stenosis is present.     Pulmonic Valve  The pulmonic valve is not well visualized.     Vessels  The aortic root is normal size.     Pericardium  There is no pericardial effusion.     Rhythm  Sinus rhythm was noted.  ______________________________________________________________________________  MMode/2D Measurements & Calculations  IVSd: 0.90 cm     LVIDd: 4.4 cm  LVIDs: 3.0 cm  LVPWd:  1.0 cm  FS: 32.9 %  LV mass(C)d: 137.8 grams  LV mass(C)dI: 87.0 grams/m2  Ao root diam: 2.6 cm  LA dimension: 3.5 cm  asc Aorta Diam: 2.8 cm  LA/Ao: 1.3  LVOT diam: 1.8 cm  LVOT area: 2.4 cm2  Ao root diam index Ht(cm/m): 1.7  Ao root diam index BSA (cm/m2): 1.6  Asc Ao diam index BSA (cm/m2): 1.8  Asc Ao diam index Ht(cm/m): 1.8  LA Volume (BP): 25.9 ml     LA Volume Index (BP): 16.4 ml/m2  RWT: 0.45  TAPSE: 2.1 cm     Doppler Measurements & Calculations  MV E max renzo: 79.7 cm/sec  MV A max renzo: 100.0 cm/sec  MV E/A: 0.80  MV dec time: 0.19 sec  Ao V2 max: 143.0 cm/sec  Ao max P.0 mmHg  Ao V2 mean: 98.6 cm/sec  Ao mean P.0 mmHg  Ao V2 VTI: 25.7 cm  FREDERICK(I,D): 2.2 cm2  FREDERICK(V,D): 2.2 cm2  LV V1 max P.0 mmHg  LV V1 max: 132.0 cm/sec  LV V1 VTI: 22.9 cm  SV(LVOT): 55.7 ml  SI(LVOT): 35.1 ml/m2  PA acc time: 0.13 sec  AV Renzo Ratio (DI): 0.92  FREDERICK Index (cm2/m2): 1.4  E/E' av.6  Lateral E/e': 8.8     Medial E/e': 10.5  RV S Renzo: 16.3 cm/sec     ______________________________________________________________________________  Report approved by: Art Reeves 01/15/2024 02:55 PM         Echo Stress Echocardiogram    Narrative    [id number]  [id number]  [id number]  [id number]^DENNIS^MATT^EL     [facility]  Echocardiography Laboratory  [address] [place] [address]     Name: NGHIA BRAVO  MRN: [id number]  : 1970  Study Date: 01/15/2024 10:52 AM  Age: 53 yrs  Gender: Female  Patient Location: [facility]  Reason For Study: Chest Pain  Ordering Physician: MATT COLLINS  Referring Physician: JOSE FERNANDEZ  Performed By: Cari Lagunas     BSA: 1.6 m2  Height: 60 in  Weight: 136 lb  HR: 93  BP: 135/83 mmHg  ______________________________________________________________________________  Procedure  Stress Echo Complete. Contrast Optison.  ______________________________________________________________________________  Interpretation Summary  The patient exercised 7:37.  The  patient exhibited no chest pain during exercise.  The EKG portion of this stress test was negative for inducible ischemia (see  echo results below).  Left ventricular cavity size decreases with exercise.  Global LV systolic function augments with exercise.  Normal resting wall motion and no stress-induced wall motion abnormality.  This was a normal stress echocardiogram with no evidence of stress-induced  ischemia.  ______________________________________________________________________________  Stress  The patient exercised 7:37.  The patient exhibited no chest pain during exercise.  Exercise was stopped due to fatigue.  There was a normal BP response to exercise.  RPP 26,376.  A treadmill exercise test according to the Jerzy protocol was performed.  Target Heart Rate was achieved.  The EKG portion of this stress test was negative for inducible ischemia (see  echo results below).  EKG artifacts during exercise.  The Duke treadmill score was low risk ( >5 Duke score).  The visual ejection fraction is >70%.  Left ventricular cavity size decreases with exercise.  Global LV systolic function augments with exercise.  Normal resting wall motion and no stress-induced wall motion abnormality.  This was a normal stress echocardiogram with no evidence of stress-induced  ischemia.     Baseline  sinus rhythm.  No regional wall motion abnormalities noted.  The visual ejection fraction is estimated at 60-65%.     Stress Results         Protocol:  Jerzy Protocol        Maximum Predicted HR:   166 bpm         Target HR: 141 bpm               % Maximum Predicted HR: 95 %            Stage  DurationHeart Rate  BP                Comment                (mm:ss)   (bpm)        Stage 1   3:00     121    142/78        Stage 2   3:00     142    154/76        Stage 3   1:37     157    168/76RPP: 26,736; FAC: ABOVE AVG; DUKE:7       RECOVERYR  6:00      97    132/85             Stress Duration:   7:37 mm:ss        Recovery Time: 6:00 mm:ss           Maximum Stress HR: 157 bpm           METS:          10     Left Ventricle  Left ventricular systolic function is normal. The visual ejection fraction is  60-65%. No regional wall motion abnormalities noted.     Mitral Valve  There is trace mitral regurgitation.     Aortic Valve  No aortic stenosis is present.     Pulmonic Valve  There is trace pulmonic valvular regurgitation.     Vessels  Normal size ascending aorta.     ______________________________________________________________________________  MMode/2D Measurements & Calculations  asc Aorta Diam: 2.8 cm  Asc Ao diam index BSA (cm/m2): 1.7  Asc Ao diam index Ht(cm/m): 1.8     Doppler Measurements & Calculations  Ao V2 max: 146.0 cm/sec  Ao max P.0 mmHg  Ao V2 mean: 101.0 cm/sec  Ao mean P.0 mmHg  Ao V2 VTI: 23.8 cm  LV V1 max P.1 mmHg  LV V1 max: 123.0 cm/sec  LV V1 VTI: 20.7 cm     AV Renzo Ratio (DI): 0.84     ______________________________________________________________________________  Report approved by: Art Boles 01/15/2024 11:44 AM             Discharge Medications   Current Discharge Medication List        START taking these medications    Details   metoprolol tartrate (LOPRESSOR) 25 MG tablet Take 0.5 tablets (12.5 mg) by mouth 2 times daily  Qty: 30 tablet, Refills: 0    Associated Diagnoses: Chest pain, unspecified type      nitroGLYcerin (NITROSTAT) 0.4 MG sublingual tablet For chest pain place 1 tablet under the tongue every 5 minutes for 3 doses. If symptoms persist 5 minutes after 1st dose call 911.  Qty: 25 tablet, Refills: 0    Associated Diagnoses: Chest pain, unspecified type           CONTINUE these medications which have NOT CHANGED    Details   clopidogrel (PLAVIX) 75 MG tablet Take 75 mg by mouth daily      Oral Electrolytes (THERMOTABS) TABS Take 1 tablet by mouth 3 times daily as needed (POTS symptoms)      potassium 99 MG TABS Take 1 tablet by mouth 2 times daily as needed (POTS symptoms)      rosuvastatin  "(CRESTOR) 40 MG tablet Take 1 tablet (40 mg) by mouth at bedtime  Qty: 30 tablet, Refills: 0    Associated Diagnoses: Chest pain, unspecified type; Coronary artery disease involving native coronary artery of native heart, unspecified whether angina present           Allergies   Allergies   Allergen Reactions    Aspirin Anaphylaxis     1/14/24: pt reports \"as a child\"     "

## 2024-01-15 NOTE — CONSULTS
[facility]    Cardiology Consultation     Date of Admission:  1/14/2024    Assessment & Plan   Amalia Blackwood is a 53 year old female who was admitted on 1/14/2024.    Chest pain  Patient had episode of somewhat atypical chest pain since she was discharged on Friday that persisted throughout the weekend and then had an episode of palpitations with some chest pressure.  This is different than what she presented with last week.  It is atypical for angina.  She does have elevated troponins but they are more or less flat without any significant delta rise and therefore I believe that is most likely demand ischemia rather than myocardial infarction.  She had EKG on admission which did not show any ischemic changes and therefore stent thrombosis is less likely.  Her symptoms have completely resolved.  She does have ongoing moderate disease in the RCA as well as 100% occluded ramus.  For further evaluation, I discussed options of exercise stress echocardiogram to rule out ischemia versus repeat coronary angiography.  At this time she favors a noninvasive approach first and therefore we will proceed with stress echo first.  If there is no significant ischemia on stress echocardiogram then I would continue medical therapy and follow-up with her primary cardiologist.    2.  Hyperlipidemia, on statin now.  Target LDL will be less than 70.  However we will also check a lipoprotein a because of strong family history of premature CAD.    Today's medical decision making was of moderate complexity.  At today's visit, reviewed the BMP, CBC, EKG myself, cardiac catheterization films were reviewed myself.  Plan discussed with the hospitalist.    Enzo Jenkins MD, MD    Primary Care Physician   Jackson Fallon    Reason for Consult   Reason for consult: I was asked by hospitalist to evaluate this patient for Chest pain and elevated trop.    History of Present Illness   Amalia Blackwood is a 53 year old  "female who presents with episodes of some chest pain and headache.     She was recently admitted to [facility] for some chest pain.  Prior to that she had a stress echocardiogram at [facility] that showed ischemia in the anterior and apical segments.  This prompted a coronary angiography last admission which revealed severe stenosis in the proximal LAD, moderate disease in the RCA and circumflex and 100% chronically totally occluded ramus.  She underwent successful PCI to the LAD and was discharged on Friday.  On the day of discharge, she had some shortness of breath and therefore Brilinta was switched to Plavix.  She has some kind of allergy to aspirin.    She has longstanding hyperlipidemia which she was monitoring closely.  She also had an abnormal CT calcium score.  She had not yet started a statin prior to the admission last week.  She was put on statin on the discharge.  She has strong family history of premature CAD.    Patient tells me that on the day of discharge, she has some soreness on her left side of her chest that persisted after she went home.  She also developed headache on Friday evening that persisted all of Saturday and Sunday.  Sunday she went for about a 20-minute walk and she tolerated well..  After she came back, she tells me that she \"crashed\".  Her headache got worse, she had some palpitations, some are sweating and nauseous feeling she then had some crushing chest pain which was different than her soreness in her left chest which is almost constant.  The crushing chest pain was off and on for about 1 and half to 2 hours and then resolved on its own after she came to the emergency room around 1:30 PM.  About 2 hours later, she started feeling better and her elevated heart rates also resolved and since then she has not had much of a concern.    Her initial troponin was 121 then changed to 105 and then again 120.  EKG on admission was reviewed by me and revealed sinus rhythm " "without any ischemic changes.    Past Medical History   Past Medical History:   Diagnosis Date    Kidney stone    Coronary artery disease with recent stent to the LAD  Hyperlipidemia      Past Surgical History   Past Surgical History:   Procedure Laterality Date    CV CORONARY ANGIOGRAM N/A 2024    Procedure: Coronary Angiogram;  Surgeon: Torsten Delgado MD;  Location:  HEART CARDI[facility] CATH LAB    CV PCI N/A 2024    Procedure: Percutaneous Coronary Intervention;  Surgeon: Torsten Delgado MD;  Location:  [facility]ARD[facility] CATH LAB    GENITOURINARY SURGERY      CT INDUCED ABORTN BY D&C      Description: Surgically Induced ;  Recorded: 2009;    CT LAP,FULGURATE/EXCISE LESIONS      Description: Laparoscopy With Excision Of [place] Obstruction;  Recorded: 2008;         Prior to Admission Medications   Prior to Admission Medications   Prescriptions Last Dose Informant Patient Reported? Taking?   Oral Electrolytes (THERMOTABS) TABS  at PRN Self Yes Yes   Sig: Take 1 tablet by mouth 3 times daily as needed (POTS symptoms)   clopidogrel (PLAVIX) 75 MG tablet 2024 Self Yes Yes   Sig: Take 75 mg by mouth daily   potassium 99 MG TABS  at PRN Self Yes Yes   Sig: Take 1 tablet by mouth 2 times daily as needed (POTS symptoms)   rosuvastatin (CRESTOR) 40 MG tablet 2024 Self No Yes   Sig: Take 1 tablet (40 mg) by mouth at bedtime      Facility-Administered Medications: None     Current Facility-Administered Medications   Medication Dose Route Frequency    clopidogrel  75 mg Oral Daily    rosuvastatin  40 mg Oral At Bedtime     Current Facility-Administered Medications   Medication Last Rate     Allergies   Allergies   Allergen Reactions    Aspirin Anaphylaxis     24: pt reports \"as a child\"       Social History    reports that she has never smoked. She has never used smokeless tobacco. She reports that she does not use drugs.      Family History     Family history of CAD at a " "young age in her mother       Review of Systems   A comprehensive review of system was performed and is negative other than that noted in the HPI or here.     Physical Exam   Vital Signs with Ranges  Temp:  [98.1  F (36.7  C)-98.2  F (36.8  C)] 98.1  F (36.7  C)  Pulse:  [] 71  Resp:  [9-19] 16  BP: (100-140)/(69-87) 100/69  SpO2:  [94 %-100 %] 99 %  Wt Readings from Last 4 Encounters:   01/15/24 63 kg (138 lb 14.4 oz)   01/12/24 64 kg (141 lb 3.2 oz)     I/O last 3 completed shifts:  In: 50 [P.O.:50]  Out: -       Vitals: /69 (BP Location: Left arm)   Pulse 71   Temp 98.1  F (36.7  C) (Oral)   Resp 16   Ht 1.524 m (5')   Wt 63 kg (138 lb 14.4 oz)   SpO2 99%   BMI 27.13 kg/m      Physical Exam:   General - Alert and oriented to time place and person in no acute distress  Eyes - No scleral icterus  HEENT - Neck supple, moist mucous membranes  Cardiovascular -regular S1 and S2 without murmur  Extremities - There is no edema  Respiratory -clear to auscultation  Skin - No pallor or cyanosis  Gastrointestinal - Non tender and non distended without rebound or guarding  Psych - Appropriate affect   Neurological - No gross motor neurological focal deficits    No lab results found in last 7 days.    Invalid input(s): \"TROPONINIES\"    Recent Labs   Lab 01/15/24  0621 01/14/24  1308 01/10/24  2225   WBC  --  7.1 6.4   HGB  --  15.0 13.2   MCV  --  86 86   PLT  --  271 241    140 138   POTASSIUM 4.3 3.8 4.3   CHLORIDE 105 101 103   CO2 27 30* 28   BUN 18.2 17.9 18.4   CR 0.73 0.76 0.62   GFRESTIMATED >90 >90 >90   ANIONGAP 8 9 7   GONZALEZ 9.7 10.1* 9.4   GLC 92 109* 118*   ALBUMIN  --   --  4.3   PROTTOTAL  --   --  6.8   BILITOTAL  --   --  0.3   ALKPHOS  --   --  122   ALT  --   --  35   AST  --   --  25     No results for input(s): \"CHOL\", \"HDL\", \"LDL\", \"TRIG\", \"CHOLHDLRATIO\" in the last 58306 hours.  Recent Labs   Lab 01/14/24  1308 01/10/24  2225   WBC 7.1 6.4   HGB 15.0 13.2   HCT 43.8 39.4   MCV 86 " "86    241     No results for input(s): \"PH\", \"PHV\", \"PO2\", \"PO2V\", \"SAT\", \"PCO2\", \"PCO2V\", \"HCO3\", \"HCO3V\" in the last 168 hours.  No results for input(s): \"NTBNPI\", \"NTBNP\" in the last 168 hours.  No results for input(s): \"DD\" in the last 168 hours.  No results for input(s): \"SED\", \"CRP\" in the last 168 hours.  Recent Labs   Lab 01/14/24  1308 01/10/24  2225    241     No results for input(s): \"TSH\" in the last 168 hours.  No results for input(s): \"COLOR\", \"APPEARANCE\", \"URINEGLC\", \"URINEBILI\", \"URINEKETONE\", \"SG\", \"UBLD\", \"URINEPH\", \"PROTEIN\", \"UROBILINOGEN\", \"NITRITE\", \"LEUKEST\", \"RBCU\", \"WBCU\" in the last 168 hours.    Imaging:  Recent Results (from the past 48 hour(s))   XR Chest 2 Views    Narrative    EXAM: XR CHEST 2 VIEWS  LOCATION: [facility]  DATE: 1/14/2024    INDICATION: Chest pain  COMPARISON: X-ray 01/11/2024      Impression    IMPRESSION: Coronary artery stent/calcifications. No acute findings. The lungs are clear and there are no pleural effusions. Normal heart size. No vascular congestion or pulmonary edema. Spinal degenerative changes.       Echo:  No results found for this or any previous visit (from the past 4320 hour(s)).    Clinically Significant Risk Factors Present on Admission                # Drug Induced Platelet Defect: home medication list includes an antiplatelet medication        # Overweight: Estimated body mass index is 27.13 kg/m  as calculated from the following:    Height as of this encounter: 1.524 m (5').    Weight as of this encounter: 63 kg (138 lb 14.4 oz).                       "

## 2024-01-16 LAB
ATRIAL RATE - MUSE: 74 BPM
DIASTOLIC BLOOD PRESSURE - MUSE: NORMAL MMHG
INTERPRETATION ECG - MUSE: NORMAL
P AXIS - MUSE: 36 DEGREES
PR INTERVAL - MUSE: 178 MS
QRS DURATION - MUSE: 76 MS
QT - MUSE: 396 MS
QTC - MUSE: 439 MS
R AXIS - MUSE: 53 DEGREES
SYSTOLIC BLOOD PRESSURE - MUSE: NORMAL MMHG
T AXIS - MUSE: 48 DEGREES
VENTRICULAR RATE- MUSE: 74 BPM

## 2024-01-16 NOTE — PLAN OF CARE
Goal Outcome Evaluation:      Plan of Care Reviewed With: patient    Overall Patient Progress: improvingOverall Patient Progress: improving  A&Ox4. Intermittent anterior left chest pain. Tele SR. VSS, on room air. Up independently. Discharge medications and instructions explained. All questions answered. Old right wrist angio site is [facility]

## 2024-01-17 ENCOUNTER — NURSE TRIAGE (OUTPATIENT)
Dept: CARDIOLOGY | Facility: CLINIC | Age: 54
End: 2024-01-17
Payer: COMMERCIAL

## 2024-01-17 NOTE — TELEPHONE ENCOUNTER
Called Pt she is no longer having any symptoms. Pt says she has done her cleaning , and mopping , and all her house work with no issue. Pt had echo and stress test 1/14/23 that shows no ischemia, and no wall motion abnormalities.     Per Hospital notes:  She was recently admitted to [facility] for some chest pain.  Prior to that she had a stress echocardiogram at [facility] that showed ischemia in the anterior and apical segments.  This prompted a coronary angiography last admission which revealed severe stenosis in the proximal LAD, moderate disease in the RCA and circumflex and 100% chronically totally occluded ramus.  She underwent successful PCI to the LAD and was discharged on Friday.  On the day of discharge, she had some shortness of breath and therefore Brilinta was switched to Plavix.  She has some kind of allergy to aspirin.     Went over signs and symptoms with Pt for possible Afib, pericarditis, and costochondritis. Pt advised as to when to return to ER. Will message scheduling to have her seen for F/U by provider.  Will message provider if any other testing before Office visitT David CAO

## 2024-01-17 NOTE — TELEPHONE ENCOUNTER
Glad, pain is resolved. If its recurrent, can see pmd or if persistent, go to ED. Pt's cardiologist - osiel Corley.

## 2024-01-17 NOTE — TELEPHONE ENCOUNTER
Amalia called because she woke up with a sharp stabbing pain over in her left breast last night. She felt like her throat was closing up and like all the blood left her face. She was checking her pulse and deep breathing and it resolved. It recurred again about 20 minutes later. This time she felt the face sensation again but did not feel like her throat was closing. She said functionally she feels fine. She wants to schedule her follow up visit. She wants to know if she should be taking 81 or 325 mg of Aspirin. She had a telephone visit this morning already with an allergist about desensitization for possible Aspirin allergy. Please call her to discuss symptoms, advise on Aspirin, and get her scheduled for follow up with our clinic. [phone]      Reason for Disposition   Chest pain lasting longer than 5 minutes and occurred in last 3 days (72 hours) (Exception: Feels exactly the same as previously diagnosed heartburn and has accompanying sour taste in mouth.)   All other patients with chest pain (Exception: Fleeting chest pain lasting a few seconds.)   Patient wants to be seen    Additional Information   Negative: Followed an injury to chest   Negative: SEVERE chest pain   Negative: Pain also in shoulder(s) or arm(s) or jaw   Negative: Difficulty breathing   Negative: Cocaine use within last 3 days   Negative: Major surgery in the past month   Negative: Hip or leg fracture (broken bone) in past month (or had cast on leg or ankle in past month)   Negative: Illness requiring prolonged bedrest in past month (e.g., immobilization, long hospital stay)   Negative: Long-distance travel in past month (e.g., car, bus, train, plane; with trip lasting 6 or more hours)   Negative: History of prior 'blood clot' in leg or lungs (i.e., deep vein thrombosis, pulmonary embolism)   Negative: History of inherited increased risk of blood clots (e.g., Factor 5 Leiden, Anti-thrombin 3, Protein C or Protein S deficiency, Prothrombin  "mutation)   Negative: Cancer treatment in the past two months (or has cancer now)   Negative: Heart beating irregularly or very rapidly   Negative: Chest pain or 'angina' comes and goes and is happening more often (increasing in frequency) or getting worse (increasing in severity) (Exception: Chest pains that last only a few seconds.)   Negative: Dizziness or lightheadedness   Negative: Coughing up blood   Negative: Patient sounds very sick or weak to the triager   Negative: Patient says chest pain feels exactly the same as previously diagnosed 'heartburn' and describes burning in chest and accompanying sour taste in mouth   Negative: Fever > 100.4 F (38.0 C)   Negative: Chest pain(s) lasting a few seconds persists > 3 days   Negative: Rash in same area as pain (may be described as 'small blisters')    Answer Assessment - Initial Assessment Questions  1. LOCATION: \"Where does it hurt?\"        Over her left breast  2. RADIATION: \"Does the pain go anywhere else?\" (e.g., into neck, jaw, arms, back)      no  3. ONSET: \"When did the chest pain begin?\" (Minutes, hours or days)       During the night  4. PATTERN: \"Does the pain come and go, or has it been constant since it started?\"  \"Does it get worse with exertion?\"       Happened twice during the night.  5. DURATION: \"How long does it last\" (e.g., seconds, minutes, hours)      minutes  6. SEVERITY: \"How bad is the pain?\"  (e.g., Scale 1-10; mild, moderate, or severe)     - MILD (1-3): doesn't interfere with normal activities      - MODERATE (4-7): interferes with normal activities or awakens from sleep     - SEVERE (8-10): excruciating pain, unable to do any normal activities        Moderate- woke her up  7. CARDIAC RISK FACTORS: \"Do you have any history of heart problems or risk factors for heart disease?\" (e.g., angina, prior heart attack; diabetes, high blood pressure, high cholesterol, smoker, or strong family history of heart disease)      STEMI s/p PCI with stent " "1/11/24. CAD. High cholesterol.   8. PULMONARY RISK FACTORS: \"Do you have any history of lung disease?\"  (e.g., blood clots in lung, asthma, emphysema, birth control pills)      no  9. CAUSE: \"What do you think is causing the chest pain?\"     Doesn't know.  10. OTHER SYMPTOMS: \"Do you have any other symptoms?\" (e.g., dizziness, nausea, vomiting, sweating, fever, difficulty breathing, cough)         Felt like all the blood left her face- like you put ice on your face; first time she felt like her throat was closing.   11. PREGNANCY: \"Is there any chance you are pregnant?\" \"When was your last menstrual period?\"        no    Protocols used: Chest Pain-A-[place]    "

## 2024-01-18 ENCOUNTER — OFFICE VISIT (OUTPATIENT)
Dept: CARDIOLOGY | Facility: CLINIC | Age: 54
End: 2024-01-18
Payer: COMMERCIAL

## 2024-01-18 VITALS
SYSTOLIC BLOOD PRESSURE: 102 MMHG | HEIGHT: 61 IN | WEIGHT: 139.2 LBS | DIASTOLIC BLOOD PRESSURE: 76 MMHG | HEART RATE: 95 BPM | BODY MASS INDEX: 26.28 KG/M2 | OXYGEN SATURATION: 98 %

## 2024-01-18 DIAGNOSIS — R00.2 PALPITATIONS: Primary | ICD-10-CM

## 2024-01-18 DIAGNOSIS — R07.9 CHEST PAIN, UNSPECIFIED TYPE: ICD-10-CM

## 2024-01-18 DIAGNOSIS — E78.00 PURE HYPERCHOLESTEROLEMIA: ICD-10-CM

## 2024-01-18 DIAGNOSIS — I25.10 CORONARY ARTERY DISEASE INVOLVING NATIVE CORONARY ARTERY OF NATIVE HEART, UNSPECIFIED WHETHER ANGINA PRESENT: ICD-10-CM

## 2024-01-18 PROCEDURE — 99215 OFFICE O/P EST HI 40 MIN: CPT | Performed by: INTERNAL MEDICINE

## 2024-01-18 RX ORDER — ASPIRIN 81 MG/1
81 TABLET ORAL DAILY
COMMUNITY

## 2024-01-18 RX ORDER — CLOPIDOGREL BISULFATE 75 MG/1
75 TABLET ORAL DAILY
Qty: 90 TABLET | Refills: 3 | Status: SHIPPED | OUTPATIENT
Start: 2024-01-18

## 2024-01-18 RX ORDER — ROSUVASTATIN CALCIUM 40 MG/1
40 TABLET, COATED ORAL AT BEDTIME
Qty: 90 TABLET | Refills: 3 | Status: SHIPPED | OUTPATIENT
Start: 2024-01-18

## 2024-01-18 NOTE — LETTER
"1/18/2024    Jackson Fallon MD  [address]  [place] [address]    RE: Amalia Blackwood       Dear Colleague,     I had the pleasure of seeing Amalia Blackwood in the [facility].  Cardiology Clinic Progress Note  Amalia Blackwood MRN# [id number]   YOB: 1970 Age: 53 year old   Primary Cardiologist: Dr. Stanton Reason for visit: hospital follow-up            Assessment and Plan:   Amalia Blackwood is a very pleasant 53 year old female who is here today for hospital follow-up.      1.  Coronary artery disease with severe stenosis noted in the proximal LAD, moderate disease in the RCA and left circumflex, and a 100%  lesion in the proximal portion of the ramus intermedius branch.  Patient underwent successful PCI to the proximal LAD with JANAE x 1 1/11/2024.  Initially started on Brilinta monotherapy given concerns for ASA allergy.  Was later transition to Plavix monotherapy secondary to shortness of breath.  Now on DAPT with ASA + Plavix after completing aspirin desensitization.    2.  Familial hypercholesterolemia, LDL elevated at 182 12/2023.  Now on rosuvastatin 40 mg once daily.  Repeat lipid panel/ALT in 2 months.  3.  Palpitations with associated flushing and sensation of \"throat closing\". Recommend real time cardiac event monitor.       Changes today:     I will take metoprolol off of her medication list as she is not taking this. She will not tolerate other antianginals at present given her lower BPs/need for salt tablets.     Patient is worried she is having an allergic to her drug eluding stent. She describes a \"loss of saliva\" that is new and concerning to her as well a few episodes of palpitations that gave her the sensation her throat was closing (this occurred prior to resuming ASA after desensitization). I will have her wear a real time cardiac event monitor x 21 days for further evaluation.     Continue ASA + Plavix daily. Patient successful desensitized ASA this " "morning. Continue rosuvastatin, lipid panel/ALT in 1-2 months.     Encouraged increased water consumption for management of her POTS as well as her dry mouth/\"loss of saliva\".    I would like her to see Dr. Stanton in 1-2 months if available. Could consider  intervention of her large ramus of ongoing symptoms.     Tess Prieto PA-C  [facility]  Pager: [phone]          History of Presenting Illness:    Amalia Blackwood is a very pleasant 53 year old female with a history of familial cholesterolemia, POTS syndrome, and elevated coronary calcium score.    After developing intermittent substernal chest discomfort, patient established care with Dr. Allison though [facility]. She underwent a treadmill stress echocardiogram on 1/4/2023 which was positive for ischemia in the apex and distal anterior wall, no EKG changes were noted, ejection fraction was estimated at 55 to 60%, with no significant valvular abnormalities. Patient exercised for 8 minutes and 34 seconds achieving 101% of her age-predicted maximum heart rate. She was started on Plavix 75 mg daily as well as rosuvastatin 20 mg daily and recommended to have coronary angiogram +/- PCI after aspirin desensitization in February at an allergist clinic. Unfortunately, patient developed recurrent chest discomfort 1/10/2024 prompting presentation to [facility] ED. Coronary angiogram +/- PCI was recommended and completed 1/11/2024. This revealed severe stenosis of the proximal LAD, moderate disease in the RCA and left circumflex, and a 100%  lesion in the proximal portion of the ramus intermedius branch.  Patient underwent successful PCI to the proximal LAD with JANAE x 1.  Given concern for potential aspirin allergy, patient was started on ticagrelor 90 mg twice daily monotherapy. She then developed shortness of breath and was transitioned to Plavix monotherapy.  She was ultimately discharged in stable condition 1/12/2024.    She then " "represented to [facility] ED 1/14/2024 with recurrent chest discomfort and headache. Troponin was elevated at 121>>105>>120. ECG without evidence of acute ischemia. She was seen by cardiology who recommend stress echocardiogram.  This was completed 1/15/2024 and was normal, without evidence of stress-induced ischemia.  The patient exercised for 7 minutes 37 seconds and achieved 95% of her maximum predicted heart rate.  She had no chest pain during exercise. Ultimately discharged 1/15/2024 in stable condition.     Patient saw Dr. Huang 1/17/2024 who gave recommendations for aspirin desensitization which patient completed this morning, 1/18/2024 without issue.     Patient is here today for hospital follow-up. Ms. Blackwood has had a rough couple of days since hospital discharge. She reports constant chest discomfort in her left chest that changes/morphs from stabbing to squeezing but is always present. This chest discomfort has been present constantly x 1 week, even prior to stenting. She also reports two episodes of palpitations yesterday evening with associated facial flushing and a sensation that her throat was closing. Each episode lasted 15 minutes in duration before subsiding spontaneously. Of note, these occurred prior to her aspirin desensitization trial this morning.     Patient also complains of a \"loss of saliva\". She used to drink 1 water bottle of water daily and is now drinking 4 daily and still feels that she has no saliva.     She is concerned she may be allergic to her recently placed drug-eluding stent.     Fortunately, today is the best day patient has had since discharge from the hospital. In fact, she is feeling reasonably well today.     Reports her POTS has been controlled for several years as long as she exercises daily. She went to the gym yesterday and completed 20 minutes of low impact cardio without issue. Usually goes to the gym 5 days per week.     She is now taking ASA + Plavix daily as " "her aspirin desensitization went well this morning. Does not want to take the metoprolol that was prescribed to her as she feels it worsens her POTS symptoms. Her BP has been low today, so she is taking salt tablets.     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      Social History       Social History     Socioeconomic History    Marital status:      Spouse name: Not on file    Number of children: Not on file    Years of education: Not on file    Highest education level: Not on file   Occupational History    Not on file   Tobacco Use    Smoking status: Never    Smokeless tobacco: Never   Substance and Sexual Activity    Alcohol use: Not on file    Drug use: Never    Sexual activity: Never   Other Topics Concern    Not on file   Social History Narrative    Not on file     Social Determinants of Health     Financial Resource Strain: Not on file   Food Insecurity: Not on file   Transportation Needs: Not on file   Physical Activity: Not on file   Stress: Not on file   Social Connections: Not on file   Interpersonal Safety: Not on file   Housing Stability: Not on file            Review of Systems:   Please see HPI         Physical Exam:   Vitals: /76 (BP Location: Right arm, Patient Position: Sitting, Cuff Size: Adult Regular)   Pulse 95   Ht 1.537 m (5' 0.5\")   Wt 63.1 kg (139 lb 3.2 oz)   SpO2 98%   BMI 26.74 kg/m     Wt Readings from Last 4 Encounters:   01/15/24 63 kg (138 lb 14.4 oz)   01/12/24 64 kg (141 lb 3.2 oz)     GEN: well nourished, in no acute distress.  HEENT:  Pupils equal, round. Sclerae nonicteric.   NECK: Supple, no masses appreciated. No JVD.  C/V:  Regular rate and rhythm, no murmur, rub or gallop.    RESP: Respirations are unlabored. Clear to auscultation bilaterally without wheezing, rales, or rhonchi.  GI: Abdomen soft, nontender.  EXTREM: no LE edema. Right radial access site healing well without evidence of hematoma.   NEURO: Alert and " "oriented, cooperative.  SKIN: Warm and dry.        Data:   LIPID RESULTS:  Lab Results   Component Value Date    CHOL 204 (H) 12/16/2009    HDL 37 (L) 12/16/2009     (H) 12/16/2009    TRIG 53 12/16/2009     LIVER ENZYME RESULTS:  Lab Results   Component Value Date    AST 25 01/10/2024    ALT 35 01/10/2024     CBC RESULTS:  Lab Results   Component Value Date    WBC 7.1 01/14/2024    RBC 5.10 01/14/2024    HGB 15.0 01/14/2024    HCT 43.8 01/14/2024    MCV 86 01/14/2024    MCH 29.4 01/14/2024    MCHC 34.2 01/14/2024    RDW 11.9 01/14/2024     01/14/2024     BMP RESULTS:  Lab Results   Component Value Date     01/15/2024    POTASSIUM 4.3 01/15/2024    POTASSIUM 3.8 12/30/2022    CHLORIDE 105 01/15/2024    CHLORIDE 104 12/30/2022    CO2 27 01/15/2024    CO2 26 12/30/2022    ANIONGAP 8 01/15/2024    ANIONGAP 8 12/30/2022    GLC 92 01/15/2024     (H) 12/30/2022    BUN 18.2 01/15/2024    BUN 24 12/30/2022    CR 0.73 01/15/2024    GFRESTIMATED >90 01/15/2024    GONZALEZ 9.7 01/15/2024      A1C RESULTS:  Lab Results   Component Value Date    A1C 5.2 12/16/2009     INR RESULTS:  No results found for: \"INR\"         Medications     Current Outpatient Medications   Medication Sig Dispense Refill    clopidogrel (PLAVIX) 75 MG tablet Take 75 mg by mouth daily      metoprolol tartrate (LOPRESSOR) 25 MG tablet Take 0.5 tablets (12.5 mg) by mouth 2 times daily 30 tablet 0    nitroGLYcerin (NITROSTAT) 0.4 MG sublingual tablet For chest pain place 1 tablet under the tongue every 5 minutes for 3 doses. If symptoms persist 5 minutes after 1st dose call 911. 25 tablet 0    Oral Electrolytes (THERMOTABS) TABS Take 1 tablet by mouth 3 times daily as needed (POTS symptoms)      potassium 99 MG TABS Take 1 tablet by mouth 2 times daily as needed (POTS symptoms)      rosuvastatin (CRESTOR) 40 MG tablet Take 1 tablet (40 mg) by mouth at bedtime 30 tablet 0          Past Medical History     Past Medical History:   Diagnosis " Date    Kidney stone      Past Surgical History:   Procedure Laterality Date    CV CORONARY ANGIOGRAM N/A 2024    Procedure: Coronary Angiogram;  Surgeon: Torsten Delgado MD;  Location:  HEART CARDIAC CATH LAB    CV PCI N/A 2024    Procedure: Percutaneous Coronary Intervention;  Surgeon: Torsten Delgado MD;  Location: [facility] CARDIAC CATH LAB    GENITOURINARY SURGERY      [place] INDUCED ABORTN BY D&C      Description: Surgically Induced ;  Recorded: 2009;    [place] LAP,FULGURATE/EXCISE LESIONS      Description: Laparoscopy With Excision Of Oviduct Obstruction;  Recorded: 2008;     No family history on file.         Allergies   Aspirin      45 minutes spent on the date of the encounter doing chart review, history and exam, documentation and further activities as noted above    VY Singh [facility]  Pager: [phone]  Thank you for allowing me to participate in the care of your patient.      Sincerely,     VY Singh [facility]  cc:   Chavez Stanton MD  [address]  [place] [address]

## 2024-01-18 NOTE — PROGRESS NOTES
"Cardiology Clinic Progress Note  Amalia Blackwood MRN# [id number]   YOB: 1970 Age: 53 year old   Primary Cardiologist: Dr. Stanton Reason for visit: hospital follow-up            Assessment and Plan:   Amalia Blackwood is a very pleasant 53 year old female who is here today for hospital follow-up.      1.  Coronary artery disease with severe stenosis noted in the proximal LAD, moderate disease in the RCA and left circumflex, and a 100%  lesion in the proximal portion of the ramus intermedius branch.  Patient underwent successful PCI to the proximal LAD with JANAE x 1 1/11/2024.  Initially started on Brilinta monotherapy given concerns for ASA allergy.  Was later transition to Plavix monotherapy secondary to shortness of breath.  Now on DAPT with ASA + Plavix after completing aspirin desensitization.    2.  Familial hypercholesterolemia, LDL elevated at 182 12/2023.  Now on rosuvastatin 40 mg once daily.  Repeat lipid panel/ALT in 2 months.  3.  Palpitations with associated flushing and sensation of \"throat closing\". Recommend real time cardiac event monitor.       Changes today:     I will take metoprolol off of her medication list as she is not taking this. She will not tolerate other antianginals at present given her lower BPs/need for salt tablets.     Patient is worried she is having an allergic to her drug eluding stent. She describes a \"loss of saliva\" that is new and concerning to her as well a few episodes of palpitations that gave her the sensation her throat was closing (this occurred prior to resuming ASA after desensitization). I will have her wear a real time cardiac event monitor x 21 days for further evaluation.     Continue ASA + Plavix daily. Patient successful desensitized ASA this morning. Continue rosuvastatin, lipid panel/ALT in 1-2 months.     Encouraged increased water consumption for management of her POTS as well as her dry mouth/\"loss of saliva\".    I would like her to see Dr." Maximino in 1-2 months if available. Could consider  intervention of her large ramus of ongoing symptoms.     Tess Prieto PA-C  [facility]  Pager: [phone]          History of Presenting Illness:    Amalia Blackwood is a very pleasant 53 year old female with a history of familial cholesterolemia, POTS syndrome, and elevated coronary calcium score.    After developing intermittent substernal chest discomfort, patient established care with Dr. Allison though [facility]. She underwent a treadmill stress echocardiogram on 1/4/2023 which was positive for ischemia in the apex and distal anterior wall, no EKG changes were noted, ejection fraction was estimated at 55 to 60%, with no significant valvular abnormalities. Patient exercised for 8 minutes and 34 seconds achieving 101% of her age-predicted maximum heart rate. She was started on Plavix 75 mg daily as well as rosuvastatin 20 mg daily and recommended to have coronary angiogram +/- PCI after aspirin desensitization in February at an allergist clinic. Unfortunately, patient developed recurrent chest discomfort 1/10/2024 prompting presentation to [facility] ED. Coronary angiogram +/- PCI was recommended and completed 1/11/2024. This revealed severe stenosis of the proximal LAD, moderate disease in the RCA and left circumflex, and a 100%  lesion in the proximal portion of the ramus intermedius branch.  Patient underwent successful PCI to the proximal LAD with JANAE x 1.  Given concern for potential aspirin allergy, patient was started on ticagrelor 90 mg twice daily monotherapy. She then developed shortness of breath and was transitioned to Plavix monotherapy.  She was ultimately discharged in stable condition 1/12/2024.    She then represented to [facility] ED 1/14/2024 with recurrent chest discomfort and headache. Troponin was elevated at 121>>105>>120. ECG without evidence of acute ischemia. She was seen by cardiology who recommend  "stress echocardiogram.  This was completed 1/15/2024 and was normal, without evidence of stress-induced ischemia.  The patient exercised for 7 minutes 37 seconds and achieved 95% of her maximum predicted heart rate.  She had no chest pain during exercise. Ultimately discharged 1/15/2024 in stable condition.     Patient saw Dr. Huang 1/17/2024 who gave recommendations for aspirin desensitization which patient completed this morning, 1/18/2024 without issue.     Patient is here today for hospital follow-up. Ms. Blackwood has had a rough couple of days since hospital discharge. She reports constant chest discomfort in her left chest that changes/morphs from stabbing to squeezing but is always present. This chest discomfort has been present constantly x 1 week, even prior to stenting. She also reports two episodes of palpitations yesterday evening with associated facial flushing and a sensation that her throat was closing. Each episode lasted 15 minutes in duration before subsiding spontaneously. Of note, these occurred prior to her aspirin desensitization trial this morning.     Patient also complains of a \"loss of saliva\". She used to drink 1 water bottle of water daily and is now drinking 4 daily and still feels that she has no saliva.     She is concerned she may be allergic to her recently placed drug-eluding stent.     Fortunately, today is the best day patient has had since discharge from the hospital. In fact, she is feeling reasonably well today.     Reports her POTS has been controlled for several years as long as she exercises daily. She went to the gym yesterday and completed 20 minutes of low impact cardio without issue. Usually goes to the gym 5 days per week.     She is now taking ASA + Plavix daily as her aspirin desensitization went well this morning. Does not want to take the metoprolol that was prescribed to her as she feels it worsens her POTS symptoms. Her BP has been low today, so she is taking salt " "tablets.     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      Social History       Social History     Socioeconomic History    Marital status:      Spouse name: Not on file    Number of children: Not on file    Years of education: Not on file    Highest education level: Not on file   Occupational History    Not on file   Tobacco Use    Smoking status: Never    Smokeless tobacco: Never   Substance and Sexual Activity    Alcohol use: Not on file    Drug use: Never    Sexual activity: Never   Other Topics Concern    Not on file   Social History Narrative    Not on file     Social Determinants of Health     Financial Resource Strain: Not on file   Food Insecurity: Not on file   Transportation Needs: Not on file   Physical Activity: Not on file   Stress: Not on file   Social Connections: Not on file   Interpersonal Safety: Not on file   Housing Stability: Not on file            Review of Systems:   Please see HPI         Physical Exam:   Vitals: /76 (BP Location: Right arm, Patient Position: Sitting, Cuff Size: Adult Regular)   Pulse 95   Ht 1.537 m (5' 0.5\")   Wt 63.1 kg (139 lb 3.2 oz)   SpO2 98%   BMI 26.74 kg/m     Wt Readings from Last 4 Encounters:   01/15/24 63 kg (138 lb 14.4 oz)   01/12/24 64 kg (141 lb 3.2 oz)     GEN: well nourished, in no acute distress.  HEENT:  Pupils equal, round. Sclerae nonicteric.   NECK: Supple, no masses appreciated. No JVD.  C/V:  Regular rate and rhythm, no murmur, rub or gallop.    RESP: Respirations are unlabored. Clear to auscultation bilaterally without wheezing, rales, or rhonchi.  GI: Abdomen soft, nontender.  EXTREM: no LE edema. Right radial access site healing well without evidence of hematoma.   NEURO: Alert and oriented, cooperative.  SKIN: Warm and dry.        Data:   LIPID RESULTS:  Lab Results   Component Value Date    CHOL 204 (H) 12/16/2009    HDL 37 (L) 12/16/2009     (H) 12/16/2009    TRIG 53 12/16/2009 " "    LIVER ENZYME RESULTS:  Lab Results   Component Value Date    AST 25 01/10/2024    ALT 35 01/10/2024     CBC RESULTS:  Lab Results   Component Value Date    WBC 7.1 01/14/2024    RBC 5.10 01/14/2024    HGB 15.0 01/14/2024    HCT 43.8 01/14/2024    MCV 86 01/14/2024    MCH 29.4 01/14/2024    MCHC 34.2 01/14/2024    RDW 11.9 01/14/2024     01/14/2024     BMP RESULTS:  Lab Results   Component Value Date     01/15/2024    POTASSIUM 4.3 01/15/2024    POTASSIUM 3.8 12/30/2022    CHLORIDE 105 01/15/2024    CHLORIDE 104 12/30/2022    CO2 27 01/15/2024    CO2 26 12/30/2022    ANIONGAP 8 01/15/2024    ANIONGAP 8 12/30/2022    GLC 92 01/15/2024     (H) 12/30/2022    BUN 18.2 01/15/2024    BUN 24 12/30/2022    CR 0.73 01/15/2024    GFRESTIMATED >90 01/15/2024    GONZALEZ 9.7 01/15/2024      A1C RESULTS:  Lab Results   Component Value Date    A1C 5.2 12/16/2009     INR RESULTS:  No results found for: \"INR\"         Medications     Current Outpatient Medications   Medication Sig Dispense Refill    clopidogrel (PLAVIX) 75 MG tablet Take 75 mg by mouth daily      metoprolol tartrate (LOPRESSOR) 25 MG tablet Take 0.5 tablets (12.5 mg) by mouth 2 times daily 30 tablet 0    nitroGLYcerin (NITROSTAT) 0.4 MG sublingual tablet For chest pain place 1 tablet under the tongue every 5 minutes for 3 doses. If symptoms persist 5 minutes after 1st dose call 911. 25 tablet 0    Oral Electrolytes (THERMOTABS) TABS Take 1 tablet by mouth 3 times daily as needed (POTS symptoms)      potassium 99 MG TABS Take 1 tablet by mouth 2 times daily as needed (POTS symptoms)      rosuvastatin (CRESTOR) 40 MG tablet Take 1 tablet (40 mg) by mouth at bedtime 30 tablet 0          Past Medical History     Past Medical History:   Diagnosis Date    Kidney stone      Past Surgical History:   Procedure Laterality Date    CV CORONARY ANGIOGRAM N/A 1/11/2024    Procedure: Coronary Angiogram;  Surgeon: Torsten Delgado MD;  Location: [facility]" CARDIAC CATH LAB    CV PCI N/A 2024    Procedure: Percutaneous Coronary Intervention;  Surgeon: Torsten Delgado MD;  Location:  HEART CARDIAC CATH LAB    GENITOURINARY SURGERY      [place] INDUCED ABORTN BY D&C      Description: Surgically Induced ;  Recorded: 2009;    [place] LAP,FULGURATE/EXCISE LESIONS      Description: Laparoscopy With Excision Of Oviduct Obstruction;  Recorded: 2008;     No family history on file.         Allergies   Aspirin      45 minutes spent on the date of the encounter doing chart review, history and exam, documentation and further activities as noted above    Tess Prieto PA-C  [facility]  Pager: [phone]

## 2024-01-18 NOTE — PATIENT INSTRUCTIONS
Call the nurse for any questions or concerns at [phone]     Plan:  1. Medication changes: none    2. Cardiac event monitor, will place today if we can    3. Follow up with Dr. Stanton in 1-2 months with labs prior (lipid panel/ALT)   -Scheduling phone number: [phone]    It was great seeing you today!    Tess Prieto PA-C  Physician Assistant  [facility]

## 2024-01-19 ENCOUNTER — HOSPITAL ENCOUNTER (OUTPATIENT)
Dept: CARDIOLOGY | Facility: CLINIC | Age: 54
Discharge: HOME | End: 2024-01-19
Attending: INTERNAL MEDICINE | Admitting: INTERNAL MEDICINE
Payer: COMMERCIAL

## 2024-01-19 DIAGNOSIS — R00.2 PALPITATIONS: ICD-10-CM

## 2024-01-19 PROCEDURE — 93272 ECG/REVIEW INTERPRET ONLY: CPT | Performed by: INTERNAL MEDICINE

## 2024-01-19 PROCEDURE — 93270 REMOTE 30 DAY ECG REV/REPORT: CPT

## 2024-01-23 ENCOUNTER — TELEPHONE (OUTPATIENT)
Dept: CARDIOLOGY | Facility: CLINIC | Age: 54
End: 2024-01-23
Payer: COMMERCIAL

## 2024-01-23 NOTE — TELEPHONE ENCOUNTER
"Called pt to discuss. Pt states that since she had the  done she feels like \"a new person\". All her sxs have resolved. Pt states the monitor was ordered d/t her sxs reported at [facility] but now that sxs are gone she wants to wait on monitor. Pt will call if she develops sxs. Routing FYI to provider.       Tess Prieto PA-C  You       \"It looks like they decided to intervene on her ramus  at [place].    We can mail her a Ziopatch monitor to be worn x 2 weeks since the event monitor was removed.    Is her next follow up with Maximino scheduled as a regular apt or a  consultation? If  consultation, we will need to alter this now that the  has been intervened on!    Thanks,  Tess Prieto PA-C on 1/23/2024 at 12:52 PM\"   NASH Wade    "

## 2024-01-23 NOTE — TELEPHONE ENCOUNTER
[facility] Call Center    Phone Message    May a detailed message be left on voicemail: yes     Reason for Call: Other: Pt wants to update that she was admitted and had another stent placed, please reach out to pt if needed.     Action Taken: Other: Cardiology    Travel Screening: Not Applicable    Thank you!  [facility]

## 2024-01-23 NOTE — TELEPHONE ENCOUNTER
"Spoke with patient. Pt states that she ended up in the hospital on 1/19/24 after she had Event monitor placed. She is currently at [facility]. See notes below (in Care Everywhere).     [facility] notes:   \"ED Course -  Ultimately presented to ED 1/19 because she had an episode earlier in the day where she was walking around outside and felt a sudden crushing chest pressure type of pain. She felt like she could not feel her hands or legs at the time and became nauseous and shaky. She was able to get to her car and put her legs up, following which her pain itself seemed to subside and is now just a soreness, though her shakiness and nausea lasted for about an hour. She did not take any nitroglycerin at the time. In the ED, the patient's vitals were stable and saturations were appropriate on room air. ECG was unremarkable and Troponin down to 10. Cardiology was consulted in the ED, and they recommended admission for repeat cath.     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 Post-cath patient endorses relief of chest pain. Will continue on DAPT with ASA/plavix for 12 months and has Cardiology follow up scheduled for 2/7/24. We have also placed a referral for cardiac rehabilitation. \"    Pt wanted to let us know that her event monitor was taken off 1/19/24, same day it was placed on her. Advised pt that is okay. I will contact Cardiopulmonary to review what to do with monitor. Pt is still in hospital. Sending FYI to Tess Wade RN    "

## 2024-01-24 NOTE — TELEPHONE ENCOUNTER
Called patient to review recommendations. Pt agrees w/ plan. States that she is still in the hospital. Pt would like to cancel the visit w/  and she will call back to set up appt with CAROLYNN in the future (once she decides her next steps).     Tess Prieto PA-C  You       Glad she is feeling better! No need for repeat monitor or [place] apt - she can see me in follow up in 1-2 months instead (or follow with [place] if she is wanting to do that!)    Thanks!!  Tess Wade RN

## 2024-02-04 ENCOUNTER — APPOINTMENT (OUTPATIENT)
Dept: CT IMAGING | Facility: CLINIC | Age: 54
End: 2024-02-04
Attending: EMERGENCY MEDICINE
Payer: COMMERCIAL

## 2024-02-04 ENCOUNTER — HOSPITAL ENCOUNTER (OUTPATIENT)
Facility: CLINIC | Age: 54
Setting detail: OBSERVATION
Discharge: HOME | End: 2024-02-05
Attending: EMERGENCY MEDICINE | Admitting: EMERGENCY MEDICINE
Payer: COMMERCIAL

## 2024-02-04 DIAGNOSIS — R07.9 CHEST PAIN, UNSPECIFIED TYPE: ICD-10-CM

## 2024-02-04 LAB
ALBUMIN SERPL BCG-MCNC: 4.2 G/DL (ref 3.5–5.2)
ALP SERPL-CCNC: 97 U/L (ref 40–150)
ALT SERPL W P-5'-P-CCNC: 69 U/L (ref 0–50)
ANION GAP SERPL CALCULATED.3IONS-SCNC: 10 MMOL/L (ref 7–15)
AST SERPL W P-5'-P-CCNC: 70 U/L (ref 0–45)
ATRIAL RATE - MUSE: 126 BPM
BASOPHILS # BLD AUTO: 0 10E3/UL (ref 0–0.2)
BASOPHILS NFR BLD AUTO: 1 %
BILIRUB DIRECT SERPL-MCNC: <0.2 MG/DL (ref 0–0.3)
BILIRUB SERPL-MCNC: 0.4 MG/DL
BUN SERPL-MCNC: 22.1 MG/DL (ref 6–20)
CALCIUM SERPL-MCNC: 9.6 MG/DL (ref 8.6–10)
CHLORIDE SERPL-SCNC: 104 MMOL/L (ref 98–107)
CREAT SERPL-MCNC: 0.68 MG/DL (ref 0.51–0.95)
CRP SERPL-MCNC: <3 MG/L
DEPRECATED HCO3 PLAS-SCNC: 27 MMOL/L (ref 22–29)
DIASTOLIC BLOOD PRESSURE - MUSE: NORMAL MMHG
EGFRCR SERPLBLD CKD-EPI 2021: >90 ML/MIN/1.73M2
EOSINOPHIL # BLD AUTO: 0.2 10E3/UL (ref 0–0.7)
EOSINOPHIL NFR BLD AUTO: 3 %
ERYTHROCYTE [DISTWIDTH] IN BLOOD BY AUTOMATED COUNT: 12 % (ref 10–15)
ERYTHROCYTE [SEDIMENTATION RATE] IN BLOOD BY WESTERGREN METHOD: 8 MM/HR (ref 0–30)
GLUCOSE SERPL-MCNC: 123 MG/DL (ref 70–99)
HCT VFR BLD AUTO: 36.9 % (ref 35–47)
HGB BLD-MCNC: 12.5 G/DL (ref 11.7–15.7)
HOLD SPECIMEN: NORMAL
HOLD SPECIMEN: NORMAL
IMM GRANULOCYTES # BLD: 0 10E3/UL
IMM GRANULOCYTES NFR BLD: 0 %
INTERPRETATION ECG - MUSE: NORMAL
LYMPHOCYTES # BLD AUTO: 2.4 10E3/UL (ref 0.8–5.3)
LYMPHOCYTES NFR BLD AUTO: 35 %
MCH RBC QN AUTO: 29.2 PG (ref 26.5–33)
MCHC RBC AUTO-ENTMCNC: 33.9 G/DL (ref 31.5–36.5)
MCV RBC AUTO: 86 FL (ref 78–100)
MONOCYTES # BLD AUTO: 0.5 10E3/UL (ref 0–1.3)
MONOCYTES NFR BLD AUTO: 7 %
NEUTROPHILS # BLD AUTO: 3.7 10E3/UL (ref 1.6–8.3)
NEUTROPHILS NFR BLD AUTO: 54 %
NRBC # BLD AUTO: 0 10E3/UL
NRBC BLD AUTO-RTO: 0 /100
NT-PROBNP SERPL-MCNC: 82 PG/ML (ref 0–900)
P AXIS - MUSE: 33 DEGREES
PLATELET # BLD AUTO: 240 10E3/UL (ref 150–450)
POTASSIUM SERPL-SCNC: 4.1 MMOL/L (ref 3.4–5.3)
PR INTERVAL - MUSE: 158 MS
PROT SERPL-MCNC: 6.6 G/DL (ref 6.4–8.3)
QRS DURATION - MUSE: 68 MS
QT - MUSE: 306 MS
QTC - MUSE: 443 MS
R AXIS - MUSE: 40 DEGREES
RBC # BLD AUTO: 4.28 10E6/UL (ref 3.8–5.2)
SODIUM SERPL-SCNC: 141 MMOL/L (ref 135–145)
SYSTOLIC BLOOD PRESSURE - MUSE: NORMAL MMHG
T AXIS - MUSE: 51 DEGREES
TROPONIN T SERPL HS-MCNC: 7 NG/L
TROPONIN T SERPL HS-MCNC: <6 NG/L
VENTRICULAR RATE- MUSE: 126 BPM
WBC # BLD AUTO: 6.7 10E3/UL (ref 4–11)

## 2024-02-04 PROCEDURE — 84484 ASSAY OF TROPONIN QUANT: CPT | Performed by: EMERGENCY MEDICINE

## 2024-02-04 PROCEDURE — 86140 C-REACTIVE PROTEIN: CPT | Performed by: EMERGENCY MEDICINE

## 2024-02-04 PROCEDURE — 93005 ELECTROCARDIOGRAM TRACING: CPT

## 2024-02-04 PROCEDURE — 80048 BASIC METABOLIC PNL TOTAL CA: CPT | Performed by: EMERGENCY MEDICINE

## 2024-02-04 PROCEDURE — 71275 CT ANGIOGRAPHY CHEST: CPT

## 2024-02-04 PROCEDURE — 250N000013 HC RX MED GY IP 250 OP 250 PS 637: Performed by: EMERGENCY MEDICINE

## 2024-02-04 PROCEDURE — 80053 COMPREHEN METABOLIC PANEL: CPT | Performed by: EMERGENCY MEDICINE

## 2024-02-04 PROCEDURE — 85025 COMPLETE CBC W/AUTO DIFF WBC: CPT | Performed by: EMERGENCY MEDICINE

## 2024-02-04 PROCEDURE — 36415 COLL VENOUS BLD VENIPUNCTURE: CPT | Performed by: EMERGENCY MEDICINE

## 2024-02-04 PROCEDURE — 85652 RBC SED RATE AUTOMATED: CPT | Performed by: EMERGENCY MEDICINE

## 2024-02-04 PROCEDURE — 250N000009 HC RX 250: Performed by: EMERGENCY MEDICINE

## 2024-02-04 PROCEDURE — 83880 ASSAY OF NATRIURETIC PEPTIDE: CPT | Performed by: EMERGENCY MEDICINE

## 2024-02-04 PROCEDURE — 250N000011 HC RX IP 250 OP 636: Performed by: EMERGENCY MEDICINE

## 2024-02-04 PROCEDURE — 99285 EMERGENCY DEPT VISIT HI MDM: CPT | Mod: 25

## 2024-02-04 PROCEDURE — 82248 BILIRUBIN DIRECT: CPT | Performed by: EMERGENCY MEDICINE

## 2024-02-04 RX ORDER — IOPAMIDOL 755 MG/ML
58 INJECTION, SOLUTION INTRAVASCULAR ONCE
Status: COMPLETED | OUTPATIENT
Start: 2024-02-04 | End: 2024-02-04

## 2024-02-04 RX ORDER — ASPIRIN 81 MG/1
324 TABLET, CHEWABLE ORAL ONCE
Status: COMPLETED | OUTPATIENT
Start: 2024-02-04 | End: 2024-02-04

## 2024-02-04 RX ADMIN — ASPIRIN 81 MG CHEWABLE TABLET 324 MG: 81 TABLET CHEWABLE at 20:14

## 2024-02-04 RX ADMIN — IOPAMIDOL 58 ML: 755 INJECTION, SOLUTION INTRAVENOUS at 20:47

## 2024-02-04 RX ADMIN — SODIUM CHLORIDE 84 ML: 9 INJECTION, SOLUTION INTRAVENOUS at 20:47

## 2024-02-04 ASSESSMENT — ACTIVITIES OF DAILY LIVING (ADL)
ADLS_ACUITY_SCORE: 35
ADLS_ACUITY_SCORE: 35

## 2024-02-05 VITALS
SYSTOLIC BLOOD PRESSURE: 121 MMHG | WEIGHT: 136 LBS | HEART RATE: 80 BPM | RESPIRATION RATE: 18 BRPM | DIASTOLIC BLOOD PRESSURE: 84 MMHG | BODY MASS INDEX: 26.7 KG/M2 | OXYGEN SATURATION: 96 % | TEMPERATURE: 98.4 F | HEIGHT: 60 IN

## 2024-02-05 LAB — TROPONIN T SERPL HS-MCNC: <6 NG/L

## 2024-02-05 PROCEDURE — 36415 COLL VENOUS BLD VENIPUNCTURE: CPT | Performed by: EMERGENCY MEDICINE

## 2024-02-05 PROCEDURE — G0378 HOSPITAL OBSERVATION PER HR: HCPCS

## 2024-02-05 PROCEDURE — 99223 1ST HOSP IP/OBS HIGH 75: CPT | Performed by: INTERNAL MEDICINE

## 2024-02-05 PROCEDURE — 250N000013 HC RX MED GY IP 250 OP 250 PS 637: Performed by: INTERNAL MEDICINE

## 2024-02-05 PROCEDURE — 250N000009 HC RX 250: Performed by: INTERNAL MEDICINE

## 2024-02-05 PROCEDURE — 84484 ASSAY OF TROPONIN QUANT: CPT | Performed by: EMERGENCY MEDICINE

## 2024-02-05 RX ORDER — LIDOCAINE 40 MG/G
CREAM TOPICAL
Status: COMPLETED | OUTPATIENT
Start: 2024-02-05 | End: 2024-02-05

## 2024-02-05 RX ORDER — MAGNESIUM HYDROXIDE/ALUMINUM HYDROXICE/SIMETHICONE 120; 1200; 1200 MG/30ML; MG/30ML; MG/30ML
30 SUSPENSION ORAL EVERY 4 HOURS PRN
Status: DISCONTINUED | OUTPATIENT
Start: 2024-02-05 | End: 2024-02-05 | Stop reason: HOSPADM

## 2024-02-05 RX ADMIN — LIDOCAINE: 40 CREAM TOPICAL at 03:28

## 2024-02-05 RX ADMIN — ALUMINUM HYDROXIDE, MAGNESIUM HYDROXIDE, AND SIMETHICONE 30 ML: 200; 200; 20 SUSPENSION ORAL at 03:31

## 2024-02-05 ASSESSMENT — ACTIVITIES OF DAILY LIVING (ADL)
ADLS_ACUITY_SCORE: 35

## 2024-02-05 NOTE — CONSULTS
[facility]    History and Physical - Hospitalist Service       Date of Admission:  2/4/2024    Assessment & Plan      Amalia Blackwood is a 53 year old female admitted on 2/4/2024. She presents to the emergency department with ongoing intermittent left chest and axilla discomfort since 1/25/2024 in the setting of recent cardiac workup with coronary angiography and stenting 1/14 and 1/22/2024.    Atypical chest pain: Negative CT PE study, recent echocardiogram January 24 without wall motion abnormality, coronary angiogram with stenting January 11 here at [facility] and subsequently repeat intervention on chronic total occlusion January 22 at [facility].  Troponin trend x 3 negative.  Chest discomfort is reproducible on palpation.    Costochondritis: Prior to January 22 coronary angiogram at [facility], she tells me that she was having some cramping chest wall pain which improved with a lidocaine patch discomfort is reproducible, largely along T5 rib.  No fracture appreciated    Initial plan was for observation admission per my discussion with ER provider as she was still having discomfort and there was some concern that this might be something more serious.  She has had an extensive cardiac workup here at [facility] and subsequently through [facility] including stress test which was initially abnormal leading to coronary angiogram and mid LAD stenting for 80% lesion January 19.  She was noted to have a chronic total occlusion with collateralization of a large ramus intermedius which was not intervened upon at that time.  Was discharged, and returned with chest discomfort.  Negative stress echocardiogram 1/15/2024.  Continued to have central chest pressure and was subsequently seen through the [facility] system.  Hospitalized, and during hospitalization while awaiting interventional list able to intervene upon chronic total occlusion, describes having a lidocaine patch in place over  the left chest which improved some of the muscular squeezing aching sensation that she had been having intermittently.  Underwent intervention on her ramus intermedius chronic total occlusion 1/22/2024 and had improvement in her crushing central chest discomfort associated with nausea following this.  She reports that this pain was not improved following stenting of 80% LAD lesion.  Following procedure, however, she was still having intermittent bouts of chest discomfort.  Troponins minimally elevated and stable during that hospitalization at 14 high-sensitivity troponin.  Patient went home, was having again repeated episodes of chest discomfort, and was hospitalized at [facility] for an observation admission.  Echocardiogram was obtained and reassuring.  Patient ambulating and chest pain-free so was discharged.  She tells me that she again had discomfort, actually the same discomfort she has been experiencing since that time, on 1/25/2024.  She tells me that the only day she has not had the discomfort that she is presenting with today was 2/1/2024.    Negative high-sensitivity troponin trend x 3, CT PE study performed negative for pulmonary embolism or pericardial effusion.    Discussed recent extensive workup, as well as reproducible discomfort more consistent with a musculoskeletal issue.  She has recently undergone gold standard evaluation and intervention with coronary angiogram x 2.  Has some residual 50% distal RCA disease, but no further flow-limiting lesions.  Recent resting echocardiogram normal, recent stress echocardiogram on January 15 even prior to chronic total occlusion intervention, EKG nonischemic, CT PE study without acute finding to explain her symptoms. With the studies performed, discussed low likelihood that repeat of any would likely result in diagnosis of her chest discomfort.  My primary suspicion is that she has costochondritis with reproducible chest discomfort.  There is no rash to  suggest zoster, and she has had symptoms for over 1 week.  No exertional worsening when she is walking her dog.  She is having symptoms even outside of describe tachycardia earlier in the evening, so discomfort is unlikely secondary to paroxysmal arrhythmia.  Provided reassurance, discussed symptom interventions at this juncture.  Received Maalox and lidocaine, and actually had improvement in her discomfort and opted for discharge in the setting.  She has close follow-up with her cardiology team this coming Wednesday.  With improvement in her pain, and per her preference, discharged from the emergency department.    -Acetaminophen as needed.  Would not initiate ibuprofen given recent initiation on dual antiplatelet therapy and coronary stenting.  When patient is further out from acute stenting and off of dual antiplatelet therapy, might be reasonable to add back in ibuprofen as needed if she still has issues with intermittent or persistent costochondritis.  -Topical lidocaine has been palliative for patient, can continue.  Available over-the-counter.  Discussed with patient on assessment.  -Continue with cardiac rehab as planned  -Patient has close follow-up with her cardiology team as of this coming Wednesday  -Outpatient physical therapy could be pursued if her discomfort persists.  High-frequency ultrasound might be a therapeutic interventional potentially available for costochondritis.  This was discussed with patient on admission as well  -Patient reports baseline blood pressure is typically in the 100 systolic range.  Could consider addition of calcium channel blocker for vasospasm if patient remains symptomatic and this is considered a potential etiology, but currently exam is inconsistent with coronary vasospasm.  Similarly, patient has not tolerated antihypertensives in the past in the setting of her POTS    Tachycardia episode: Patient with hypertension and tachycardia this past evening associated with  pain.  Heart rate was in the 140s range.  She also has a history of POTS, but tells me she was not feeling lightheaded as she typically would with her postural orthostatic tachycardia syndrome during this event.  No mention of irregular rhythm on home blood pressure cuff with this reading, hypertensive in the 140s range.  -Could consider repeat outpatient cardiac monitor.  Recent cardiac monitor still awaiting interpretation.   -Continue high salt diet, physical activity (though note she is awaiting cardiac rehab clearance.)    Coronary artery disease: Status post drug-eluting stent placement 1/11/2024 in mid LAD for 80% lesion, 1/22/2024 intervention on chronic total occlusion of ramus intermedius at [facility].  Familial hyperlipidemia:  -Continue Plavix, aspirin  -Continue statin              Diet:   Regular diet    Clinically Significant Risk Factors Present on Admission                # Drug Induced Platelet Defect: home medication list includes an antiplatelet medication        # Overweight: Estimated body mass index is 26.56 kg/m  as calculated from the following:    Height as of this encounter: 1.524 m (5').    Weight as of this encounter: 61.7 kg (136 lb).              Disposition Plan      Expected Discharge Date: 02/06/2024                  Derrick Hanks MD  Hospitalist Service  [facility]  Securely message with [employer] (more info)  Text page via [employer] Paging/Directory     ______________________________________________________________________    Chief Complaint   Left-sided chest discomfort    History is obtained from the patient, chart review, discussion with Dr Wei in the emergency department, review of outside records including recent [facility] hospitalizations    History of Present Illness   Amalia Blackwood is a 53 year old female who presents to the emergency department for assessment of stabbing chest discomfort primarily under her midclavicular left breast, but  also with some discomfort towards her left axilla.  Reports occasionally she will have some right-sided symptoms which are similar.      Initial plan was for observation admission per my discussion with ER provider as she was still having discomfort despite negative troponin trend.  In meeting with patient, she tells me that she initially had hoped to go home, but, per her description, ER provider felt it better that she be evaluated and potentially observed.  Following an extensive conversation with patient regarding recent hospitalizations and cardiac workup, exam, review of prior results and discussion regarding symptom control, patient expressed hope that she would be able to discharge from the emergency department rather than remain in the hospitalization if her symptoms were improved.  She did indeed have improvement in her symptoms and request discharge following lidocaine administration as well as Maalox.      She has had an extensive cardiac workup here at [facility] and subsequently through [facility] including stress test which was initially abnormal leading to coronary angiogram and mid LAD stenting for 80% lesion January 19.  She was noted to have a chronic total occlusion with collateralization of a large ramus intermedius which was not intervened upon at that time.  Was discharged, and returned with chest discomfort.  Negative stress echocardiogram 1/15/2024.  Continued to have central chest pressure and was subsequently seen through the [facility] system.  Hospitalized, and during hospitalization while awaiting interventional list able to intervene upon chronic total occlusion, describes having a lidocaine patch in place over the left chest which improved some of the muscular squeezing aching sensation that she had been having intermittently.  Underwent intervention on her ramus intermedius chronic total occlusion 1/22/2024 and had improvement in her crushing central chest discomfort  associated with nausea following this.  She reports that this pain was not improved following stenting of 80% LAD lesion.  Following procedure, however, she was still having intermittent bouts of chest discomfort.  Troponins minimally elevated and stable during that hospitalization at 14 high-sensitivity troponin.  Patient went home, was having again repeated episodes of chest discomfort, and was hospitalized at [facility] for an observation admission.  Echocardiogram was obtained and reassuring.  Patient ambulating and chest pain-free so was discharged.  She tells me that she again had discomfort, actually the same discomfort she has been experiencing since that time, on 1/25/2024.  She tells me that the only day she has not had the discomfort that she is presenting with today was 2/1/2024.    Negative high-sensitivity troponin trend x 3, CT PE study performed negative for pulmonary embolism or pericardial effusion.    Discussed recent extensive workup, as well as reproducible discomfort more consistent with a musculoskeletal issue.  She has recently undergone gold standard evaluation and intervention with coronary angiogram x 2.  Has some residual 50% distal RCA disease, but no further flow-limiting lesions.  Recent resting echocardiogram normal, recent stress echocardiogram on January 15 even prior to chronic total occlusion intervention, EKG nonischemic, CT PE study without acute finding to explain her symptoms. With the studies performed, discussed low likelihood that repeat of any would likely result in diagnosis of her chest discomfort.  My primary suspicion is that she has costochondritis with reproducible chest discomfort.  There is no rash to suggest zoster, and she has had symptoms for over 1 week.  No exertional worsening when she is walking her dog.  She is having symptoms even outside of describe tachycardia earlier in the evening, so discomfort is unlikely secondary to paroxysmal arrhythmia.   Provided reassurance, discussed symptom interventions at this juncture.  Received Maalox and lidocaine, and actually had improvement in her discomfort and opted for discharge in the setting.  She has close follow-up with her cardiology team this coming Wednesday.  With improvement in her pain, and per her preference, discharged from the emergency department.    Note that patient describes her episodes of chest discomfort in a few different ways.  Prior to coronary angiograms with intervention, she was having what she describes as crushing central chest discomfort which was associated with nausea, would radiate to her neck.  She tells me that this discomfort did not improve after her first angiogram and intervention on LAD lesion, did improve following intervention on chronic total occlusion of ramus intermedius.    Following coronary angiogram, patient began having more left-sided chest discomfort similar to what she is experiencing now.  Not associated with shortness of breath or radiation into the neck, not associated with nausea.  Had it prior to discharge following second angiogram, and was experiencing this discomfort leading to observation hospitalization at [facility] in the following days.  It was thought this was potentially secondary to reperfusion, though note that chronic total occlusion had collaterals, and a negative stress test with negative troponin trend even prior to intervention.  Also notes that she had negative troponins prior to 1/11/2024 angiography, but troponins positive in the 100 range January 14 after stenting.  Remained flat at that time with negative stress test.  Low normal troponin through [facility] system prior to ramus intermediate intervention (10, 7).  Minimally elevated high-sensitivity troponin during her observation admission at 15 and stable.    She has not yet initiated cardiac rehab, but tells me that she has been walking her dog.  Does not have a change or worsening of  her chest discomfort when she is exerting herself.  Reproducibility is also very reassuring.    Past Medical History    Past Medical History:   Diagnosis Date    Kidney stone        Past Surgical History   Past Surgical History:   Procedure Laterality Date    CV CORONARY ANGIOGRAM N/A 2024    Procedure: Coronary Angiogram;  Surgeon: Torsten Delgado MD;  Location:  HEART CARDIAC CATH LAB    CV PCI N/A 2024    Procedure: Percutaneous Coronary Intervention;  Surgeon: Torsten Delgado MD;  Location:  HEART CARDIAC CATH LAB    GENITOURINARY SURGERY      DC INDUCED ABORTN BY D&C      Description: Surgically Induced ;  Recorded: 2009;    DC LAP,FULGURATE/EXCISE LESIONS      Description: Laparoscopy With Excision Of [place] Obstruction;  Recorded: 2008;       Prior to Admission Medications   Prior to Admission Medications   Prescriptions Last Dose Informant Patient Reported? Taking?   Oral Electrolytes (THERMOTABS) TABS  Self Yes No   Sig: Take 1 tablet by mouth 3 times daily as needed (POTS symptoms)   aspirin 81 MG EC tablet   Yes No   Sig: Take 81 mg by mouth daily   clopidogrel (PLAVIX) 75 MG tablet   No No   Sig: Take 1 tablet (75 mg) by mouth daily   nitroGLYcerin (NITROSTAT) 0.4 MG sublingual tablet   No No   Sig: For chest pain place 1 tablet under the tongue every 5 minutes for 3 doses. If symptoms persist 5 minutes after 1st dose call 911.   potassium 99 MG TABS  Self Yes No   Sig: Take 1 tablet by mouth 2 times daily as needed (POTS symptoms)   rosuvastatin (CRESTOR) 40 MG tablet   No No   Sig: Take 1 tablet (40 mg) by mouth at bedtime      Facility-Administered Medications: None           Physical Exam   Vital Signs: Temp: 98.4  F (36.9  C) Temp src: Oral BP: 124/82 Pulse: 86   Resp: 22 SpO2: 96 % O2 Device: None (Room air)    Weight: 136 lbs 0 oz    General Appearance: Well-appearing 53-year-old male female resting comfortably in bed.  She is in no acute distress.   Does not appear toxic  Eyes: No scleral icterus or injection  HEENT: Normocephalic and atraumatic  Respiratory: Breath sounds are clear bilateral to auscultation with no wheezes, no crackles excellent effort.  No splinting  Cardiovascular: Regular rate and rhythm with no murmur.  Heart rate in the mid 70s.  Skin: No jaundice, no petechiae.  No rash to suggest zoster  Musculoskeletal: Muscular tone and bulk intact in extremities and appropriate for age.  With palpation of fifth rib on the left at nipple line, patient has reproducible discomfort.  She also has discomfort along the mid axillary line near her breast.  Not able to reproduce discomfort on the right.  With sternal/central chest palpation, patient describes a burning sensation; has not otherwise reported any reflux symptoms and has no epigastric discomfort.  No CVA tenderness to percussion.  Neurologic: Alert, conversant, appropriate conversation.  Mental status is grossly intact.  Psychiatric: Very pleasant, normal affect.  Does not appear overtly anxious.    Medical Decision Making       85 MINUTES SPENT BY ME on the date of service doing chart review, history, exam, documentation & further activities per the note.      Data     I have personally reviewed the following data over the past 24 hrs:    6.7  \   12.5   / 240     141 104 22.1 (H) /  123 (H)   4.1 27 0.68 \     ALT: 69 (H) AST: 70 (H) AP: 97 TBILI: 0.4   ALB: 4.2 TOT PROTEIN: 6.6 LIPASE: N/A     Trop: <6 BNP: 82     Procal: N/A CRP: <3.00 Lactic Acid: N/A         Imaging results reviewed over the past 24 hrs:   Recent Results (from the past 24 hour(s))   CT Chest Pulmonary Embolism w Contrast    Narrative    EXAM: CT CHEST PULMONARY EMBOLISM W CONTRAST  LOCATION: [facility]  DATE: 2/4/2024    INDICATION: CP, SOB, PE.  COMPARISON: Chest radiograph 01/14/2024.  TECHNIQUE: CT chest pulmonary angiogram during arterial phase injection of IV contrast. Multiplanar reformats  and MIP reconstructions were performed. Dose reduction techniques were used.   CONTRAST: 58 mL Isovue 370.    FINDINGS:  ANGIOGRAM CHEST: No convincing pulmonary embolus. Thoracic aorta is negative for dissection. No CT evidence of right heart strain.    LUNGS AND PLEURA: No focal consolidation, pleural effusion or pneumothorax. Right upper lobe 3 mm nodule (7/86).    MEDIASTINUM/AXILLAE: No lymphadenopathy.    CORONARY ARTERY CALCIFICATION: Coronary stents.    UPPER ABDOMEN: No acute findings.    MUSCULOSKELETAL: No aggressive osseous lesion. Mild degenerative changes of the spine.      Impression    IMPRESSION:  1.  No convincing pulmonary embolus or acute finding within the thorax.

## 2024-02-05 NOTE — ED PROVIDER NOTES
History     Chief Complaint:  Chest pain    HPI   Amalia Blackwood is a 53 year old female, on dual antiplatelet therapy with aspirin and Plavix and history of CAD, POTS, and hypercholesterolemia, who is status post coronary stent placement on 1/11/24 and 1/22/24 and now presents for evaluation of chest pain. She reports that since being discharged after her most recent stent placement, she has had a soreness in her left ribs and back. She states that this has been pretty constant since then. She notes that tonight around 1845, she was laying down and experienced a new, stabbing pain in her left chest by her ribs. She states that at the time, her blood pressure and heart rate were also both high. She adds that she did take one nitroglycerin, which did not really help. She reports that she is nauseous now and has been intermittently for the past week. She adds that she occasionally has shortness of breath and night sweats. She notes this pain is different from the pain she experienced prior to her cardiac stent placement.  Since being in the ER, her pain has waxed and waned in severity and come and gone intermittently as well.  She denies chest pain with respiration, abdominal pain, fever, cough, dysuria, hematuria, vomiting, constipation, and diarrhea. She reports she has not missed any doses of her medications.     Independent Historian:   None - Patient Only    Review of External Notes:   I reviewed discharge summary from 1/24/24 when the patient was discharged from [facility]. Noted follow up with cardiology at [facility] scheduled for 2/7/24.  I reviewed discharge summary from 1/23/24 when the patient was discharged from [facility] after she presented with chest pain.  I reviewed [facility] discharge summary from 1/15/24 after she presented with left sided chest pain following stent placement.  I reviewed [facility] discharge summary from 1/12/24 after stent  placement to the proximal LAD.    Medications:    Aspirin 81 mg  Plavix  Nitroglycerin  Rosuvastatin    Past Medical History:    Hypercholesterolemia  Nephrolithiasis  CAD  Elevated troponin  POTS    Past Surgical History:    Vitrectomy   Coronary angiogram  Surgically induced   Laparoscopy with excision of oviduct obstruction   Ureteroscopic stone extraction  Coronary stent placement     Physical Exam   Patient Vitals for the past 24 hrs:   BP Temp Temp src Pulse Resp SpO2 Height Weight   24 0000 124/82 -- -- 86 22 96 % -- --   24 2300 114/78 98.4  F (36.9  C) Oral 78 18 96 % -- --   24 -- -- -- 76 20 96 % -- --   24 -- -- -- -- 30 96 % -- --   24 116/79 -- -- 92 -- 97 % -- --   24 -- -- -- 101 12 98 % -- --   24 125/72 -- -- 98 12 97 % -- --   24 108/71 -- -- 92 25 94 % -- --   24 -- -- -- 97 14 97 % -- --   24 110/75 -- -- 89 13 96 % -- --   24 108/77 -- -- -- -- -- -- --   24 111/75 97.2  F (36.2  C) Temporal 113 16 96 % 1.524 m (5') 61.7 kg (136 lb)      Physical Exam  Nursing note and vitals reviewed.  Constitutional:  Oriented to person, place, and time. Cooperative.   HENT:   Nose:    Nose normal.   Mouth/Throat:   Mucous membranes are normal.   Eyes:    Conjunctivae normal and EOM are normal.      Pupils are equal, round, and reactive to light.   Neck:    Trachea normal.   Cardiovascular:  Tachycardic rate, regular rhythm, normal heart sounds and normal pulses. No murmur heard.  Pulmonary/Chest:  Effort normal and breath sounds normal.   Abdominal:   Soft. Normal appearance and bowel sounds are normal.      There is no tenderness.      There is no rebound and no CVA tenderness.   Musculoskeletal:  There is some reproducible tenderness to palpation to the left chest wall region extremities atraumatic x 4.   Lymphadenopathy:  No cervical adenopathy.   Neurological:   Alert and  oriented to person, place, and time. Normal strength.      No cranial nerve deficit or sensory deficit. GCS eye subscore is 4. GCS verbal subscore is 5. GCS motor subscore is 6.   Skin:    Skin is intact. No rash noted.   Psychiatric:   Normal mood and affect.    Emergency Department Course   ECG  ECG taken at 1917, ECG read at 1948  Sinus tachycardia   Rate 126 bpm. MN interval 158 ms. QRS duration 68 ms. QT/QTc 306/443 ms. P-R-T axes 33 40 51.     Imaging:  CT Chest Pulmonary Embolism w Contrast   Final Result   IMPRESSION:   1.  No convincing pulmonary embolus or acute finding within the thorax.         Laboratory:  Labs Ordered and Resulted from Time of ED Arrival to Time of ED Departure   BASIC METABOLIC PANEL - Abnormal       Result Value    Sodium 141      Potassium 4.1      Chloride 104      Carbon Dioxide (CO2) 27      Anion Gap 10      Urea Nitrogen 22.1 (*)     Creatinine 0.68      GFR Estimate >90      Calcium 9.6      Glucose 123 (*)    HEPATIC FUNCTION PANEL - Abnormal    Protein Total 6.6      Albumin 4.2      Bilirubin Total 0.4      Alkaline Phosphatase 97      AST 70 (*)     ALT 69 (*)     Bilirubin Direct <0.20     TROPONIN T, HIGH SENSITIVITY - Normal    Troponin T, High Sensitivity <6     NT PROBNP INPATIENT - Normal    N terminal Pro BNP Inpatient 82     CRP INFLAMMATION - Normal    CRP Inflammation <3.00     ERYTHROCYTE SEDIMENTATION RATE AUTO - Normal    Erythrocyte Sedimentation Rate 8     TROPONIN T, HIGH SENSITIVITY - Normal    Troponin T, High Sensitivity 7     TROPONIN T, HIGH SENSITIVITY - Normal    Troponin T, High Sensitivity <6     CBC WITH PLATELETS AND DIFFERENTIAL    WBC Count 6.7      RBC Count 4.28      Hemoglobin 12.5      Hematocrit 36.9      MCV 86      MCH 29.2      MCHC 33.9      RDW 12.0      Platelet Count 240      % Neutrophils 54      % Lymphocytes 35      % Monocytes 7      % Eosinophils 3      % Basophils 1      % Immature Granulocytes 0      NRBCs per 100 WBC 0       Absolute Neutrophils 3.7      Absolute Lymphocytes 2.4      Absolute Monocytes 0.5      Absolute Eosinophils 0.2      Absolute Basophils 0.0      Absolute Immature Granulocytes 0.0      Absolute NRBCs 0.0          Emergency Department Course & Assessments:     Interventions:  Medications   aspirin (ASA) chewable tablet 324 mg (324 mg Oral $Given 2/4/24 2014)   Saline (84 mLs As instructed $Given 2/4/24 2047)   iopamidol (ISOVUE-370) solution 58 mL (58 mLs Intravenous $Given 2/4/24 2047)      Assessments:  1848 I obtained history and examined the patient as noted above.   2131 I rechecked and updated the patient.   0130 I rechecked and updated the patient.     Independent Interpretation (X-rays, CTs, rhythm strip):  None    Consultations/Discussion of Management or Tests:  0139 I spoke with Dr. Hanks of the hospitalist service regarding admission.        Social Determinants of Health affecting care:   None    Disposition:  The patient was admitted to the hospital under the care of Dr. Hanks.     Impression & Plan    Medical Decision Making:  This is a 53-year-old female who came in for further evaluation of left-sided chest pain.  Her pain seems quite atypical for cardiac causes, and it is also quite different than the pain she had prior to her stent placement.  It also seems reproducible on exam, making me think this is likely from musculoskeletal causes.  Her heart rate and blood pressure did come back down to normal levels after being here.  I proceeded with the above workup here including an EKG, CT scan of her chest, and blood work.  I was concerned that she could have a PE, pleural effusions, pulmonary edema, or possibly a pericardial effusion, in addition to recurrent ischemia.  Her CT scan does not show anything acute, and her blood work also came back unremarkable.  Her EKG was unremarkable as well other than showing sinus tachycardia.  Her first troponin came back at less than 6.  I felt it was necessary to  obtain a delta troponin, which then came back at 7.  I discussed staying in the hospital versus going home at that point, and she still prefers to go home.  Therefore I felt it was necessary to obtain a third troponin.  Thankfully, that third troponin did come back less than 6 again.  I spent an extensive amount of time talking with her about how we should proceed and whether or not she needed to stay in the hospital again.  She is still quite concerned about what might be causing this.  I suspect that it might be musculoskeletal in origin, but she is continuing to have some intermittent pain even here in the ER.  I think it is reasonable given her recent history that she stay overnight for further evaluation and management.  I subsequently spoke with Dr. Hanks, who will be taking care of her.      Diagnosis:    ICD-10-CM    1. Chest pain, unspecified type  R07.9            Scribe Disclosure:  I, Nevaehrivera Gannon, am serving as a scribe at 8:07 PM on 2/4/2024 to document services personally performed by Pito Wei MD   based on my observations and the provider's statements to me.     2/4/2024   Pito Wei MD Lashkowitz, Seth H, MD  02/05/24 0146

## 2024-02-05 NOTE — PROGRESS NOTES
RECEIVING UNIT ED HANDOFF REVIEW    ED Nurse Handoff Report was reviewed by: Gabriel Vazquez RN on February 5, 2024 at 3:05 AM

## 2024-02-05 NOTE — ED TRIAGE NOTES
Pt has stents placed on January 11th and January 22nd. Pt reports she developed pain to L axillary area after procedure and the pain has since gotten worse. Today Pt reports with the increased pain she developed palpitations.Pt took 1 nitroglycerin at 1910

## 2024-02-05 NOTE — PROVIDER NOTIFICATION
Brief note, full consult note pending:    Costochondritis type pain, reproducible w/ palpation.  Chest discomfort has been present every day since last observation discharge 1/24 with the exception of 2/1/2024 she did not have chest discomfort (a similar discomfort from 1/25/2024 - 2/4/2024 except for February 1).    No exertional worsening  CT PE study without etiology for discomfort, no pericardial effusion.    Extensive cardiac workup including coronary angiogram x2 in January.    TTE 1/24/24 without acute abnormality  Stress TTE 1/15/24 (following initial angiogram and stent)    Troponin trend x3 negative.      Initially was a plan for observation admission.   Met with patient and she had hoped to go home.  Discussed history of her chest discomfort as well as recent workup and potential etiologies.  Recent diagnostic studies were reassuring.  Last coronary angiogram 1/22/2024 at [facility].  Chest pain does not appear to be cardiac in nature      Recommend acetaminophen, topical lidocaine okay to use (OTC [facility]).  Would not start NSAIDs given recent cardiac stenting on dual antiplatelet therapy.  In the future, when patient is further out from stenting and DAPT dropped, might consider addition of ibuprofen if persistent/intermittent costochondritis symptoms.    Consider outpatient physical therapy for high frequency ultrasound treatment if persisting.    Has follow up with cardiology on Wednesday.    Could consider Zio patch for episode of tachycardia that occurred with pain.  She did have a recent event monitor with results pending.  Tachycardia could have been pain related or even related to her POTS.  Will defer repeat cardiac monitor to cardiology team as she has close follow-up.  She already monitors her blood pressure and heart rate frequently for remote monitoring of POTS.    Given Maalox and lidocaine cream in the emergency department, and had improvement in her discomfort.  As such, desired  discharge home.  This seems appropriate from my perspective as it does not appear patient's chest discomfort is secondary to ACS.    Derrick Hanks MD  4:53 AM

## 2024-02-05 NOTE — ED NOTES
"[facility]  ED Nurse Handoff Report    ED Chief complaint: Chest Pain and Post-op Problem      ED Diagnosis:   Final diagnoses:   Chest pain, unspecified type       Code Status:  Per the admitting provide       Allergies: No Known Allergies    Patient Story:  Pt c/o  worsening lt lateral  non-radiating chest wall and discomfort since having stents placed in the LAD on 1/11/24 and 1/22/24.  She described the CP as \" sharp. it felt like some one was pricking a knife into my lt side\" Pt reports taking x 1 NTG @ 19:10 prior to Ed arrival without relief. Hx.  CAD, MI with stents placement 1/11/24 and 1/22/24.    Focused Assessment:  Pt a/o x 4, CP resolved at this time. S1-S2 without murmur. No NELSON noted. Lungs are clear bilaterally on auscultation with adequate O2 Sats on room air.     Treatments and/or interventions provided: Lab, EKG, CT,     Results for orders placed or performed during the hospital encounter of 02/04/24   CT Chest Pulmonary Embolism w Contrast     Status: None    Narrative    EXAM: CT CHEST PULMONARY EMBOLISM W CONTRAST  LOCATION: [facility]  DATE: 2/4/2024    INDICATION: CP, SOB, PE.  COMPARISON: Chest radiograph 01/14/2024.  TECHNIQUE: CT chest pulmonary angiogram during arterial phase injection of IV contrast. Multiplanar reformats and MIP reconstructions were performed. Dose reduction techniques were used.   CONTRAST: 58 mL Isovue 370.    FINDINGS:  ANGIOGRAM CHEST: No convincing pulmonary embolus. Thoracic aorta is negative for dissection. No CT evidence of right heart strain.    LUNGS AND PLEURA: No focal consolidation, pleural effusion or pneumothorax. Right upper lobe 3 mm nodule (7/86).    MEDIASTINUM/AXILLAE: No lymphadenopathy.    CORONARY ARTERY CALCIFICATION: Coronary stents.    UPPER ABDOMEN: No acute findings.    MUSCULOSKELETAL: No aggressive osseous lesion. Mild degenerative changes of the spine.      Impression    IMPRESSION:  1.  No " convincing pulmonary embolus or acute finding within the thorax.   Basic metabolic panel     Status: Abnormal   Result Value Ref Range    Sodium 141 135 - 145 mmol/L    Potassium 4.1 3.4 - 5.3 mmol/L    Chloride 104 98 - 107 mmol/L    Carbon Dioxide (CO2) 27 22 - 29 mmol/L    Anion Gap 10 7 - 15 mmol/L    Urea Nitrogen 22.1 (H) 6.0 - 20.0 mg/dL    Creatinine 0.68 0.51 - 0.95 mg/dL    GFR Estimate >90 >60 mL/min/1.73m2    Calcium 9.6 8.6 - 10.0 mg/dL    Glucose 123 (H) 70 - 99 mg/dL   Troponin T, High Sensitivity     Status: Normal   Result Value Ref Range    Troponin T, High Sensitivity <6 <=14 ng/L   [place] Draw     Status: None    Narrative    The following orders were created for panel order [place] Draw.  Procedure                               Abnormality         Status                     ---------                               -----------         ------                     Extra Blue Top Tube[[id number]]                              Final result               Extra Red Top Tube[[id number]]                               Final result                 Please view results for these tests on the individual orders.   CBC with platelets and differential     Status: None   Result Value Ref Range    WBC Count 6.7 4.0 - 11.0 10e3/uL    RBC Count 4.28 3.80 - 5.20 10e6/uL    Hemoglobin 12.5 11.7 - 15.7 g/dL    Hematocrit 36.9 35.0 - 47.0 %    MCV 86 78 - 100 fL    MCH 29.2 26.5 - 33.0 pg    MCHC 33.9 31.5 - 36.5 g/dL    RDW 12.0 10.0 - 15.0 %    Platelet Count 240 150 - 450 10e3/uL    % Neutrophils 54 %    % Lymphocytes 35 %    % Monocytes 7 %    % Eosinophils 3 %    % Basophils 1 %    % Immature Granulocytes 0 %    NRBCs per 100 WBC 0 <1 /100    Absolute Neutrophils 3.7 1.6 - 8.3 10e3/uL    Absolute Lymphocytes 2.4 0.8 - 5.3 10e3/uL    Absolute Monocytes 0.5 0.0 - 1.3 10e3/uL    Absolute Eosinophils 0.2 0.0 - 0.7 10e3/uL    Absolute Basophils 0.0 0.0 - 0.2 10e3/uL    Absolute Immature Granulocytes 0.0 <=0.4 10e3/uL     Absolute NRBCs 0.0 10e3/uL   Extra Blue Top Tube     Status: None   Result Value Ref Range    Hold Specimen JIC    Extra Red Top Tube     Status: None   Result Value Ref Range    Hold Specimen JIC    Hepatic function panel     Status: Abnormal   Result Value Ref Range    Protein Total 6.6 6.4 - 8.3 g/dL    Albumin 4.2 3.5 - 5.2 g/dL    Bilirubin Total 0.4 <=1.2 mg/dL    Alkaline Phosphatase 97 40 - 150 U/L    AST 70 (H) 0 - 45 U/L    ALT 69 (H) 0 - 50 U/L    Bilirubin Direct <0.20 0.00 - 0.30 mg/dL   Nt probnp inpatient (BNP)     Status: Normal   Result Value Ref Range    N terminal Pro BNP Inpatient 82 0 - 900 pg/mL   CRP inflammation     Status: Normal   Result Value Ref Range    CRP Inflammation <3.00 <5.00 mg/L   Erythrocyte sedimentation rate auto     Status: Normal   Result Value Ref Range    Erythrocyte Sedimentation Rate 8 0 - 30 mm/hr   Troponin T, High Sensitivity     Status: Normal   Result Value Ref Range    Troponin T, High Sensitivity 7 <=14 ng/L   Troponin T, High Sensitivity     Status: Normal   Result Value Ref Range    Troponin T, High Sensitivity <6 <=14 ng/L   EKG 12-lead, tracing only     Status: None   Result Value Ref Range    Systolic Blood Pressure  mmHg    Diastolic Blood Pressure  mmHg    Ventricular Rate 126 BPM    Atrial Rate 126 BPM    CA Interval 158 ms    QRS Duration 68 ms     ms    QTc 443 ms    P Axis 33 degrees    R AXIS 40 degrees    T Axis 51 degrees    Interpretation ECG       Sinus tachycardia  Otherwise normal ECG  When compared with ECG of 14-JAN-2024 13:01,  No significant change was found  Confirmed by GENERATED REPORT, COMPUTER ([id number]),  OLE BREEN ([id number]) on 2/4/2024 7:40:11 PM     CBC with Platelets & Differential     Status: None    Narrative    The following orders were created for panel order CBC with Platelets & Differential.  Procedure                               Abnormality         Status                     ---------                                -----------         ------                     CBC with platelets and d...[[id number]]                      Final result                 Please view results for these tests on the individual orders.      Patient's response to treatments and/or interventions: Stable     To be done/followed up on inpatient unit:  See orders     Does this patient have any cognitive concerns?:  No     Activity level - Baseline/Home:  Independent  Activity Level - Current:   Independent    Patient's Preferred language: English   Needed?: No    Isolation: None  Infection: Not Applicable  Patient tested for COVID 19 prior to admission: YES  Bariatric?: No    Vital Signs:   Vitals:    02/04/24 2213 02/04/24 2222 02/04/24 2300 02/05/24 0000   BP:   114/78 124/82   Pulse:  76 78 86   Resp: 30 20 18 22   Temp:   98.4  F (36.9  C)    TempSrc:   Oral    SpO2: 96% 96% 96% 96%   Weight:       Height:           Cardiac Rhythm: SR rate 79    Was the PSS-3 completed:   Yes  What interventions are required if any?               Family Comments:  Family member x 1  is at the bedside   OBS brochure/video discussed/provided to patient/family: Yes              Name of person given brochure if not patient: N/A               Relationship to patient: N/A     For the majority of the shift this patient's behavior was Green.   Behavioral interventions performed were N/A .    ED NURSE PHONE NUMBER: *[phone]

## 2024-02-06 ENCOUNTER — PATIENT OUTREACH (OUTPATIENT)
Dept: CARE COORDINATION | Facility: CLINIC | Age: 54
End: 2024-02-06
Payer: COMMERCIAL

## 2024-02-06 NOTE — PROGRESS NOTES
[facility]: [facility]: Post-Discharge Note  SITUATION                                                      Admission:    Admission Date: 02/04/24   Reason for Admission: Chest pain, unspecified type  Discharge:   Discharge Date: 02/05/24  Discharge Diagnosis: Chest pain, unspecified type    BACKGROUND                                                      Per hospital discharge summary and inpatient provider notes:    Amalia Blackwood is a 53 year old female, on dual antiplatelet therapy with aspirin and Plavix and history of CAD, POTS, and hypercholesterolemia, who is status post coronary stent placement on 1/11/24 and 1/22/24 and now presents for evaluation of chest pain. She reports that since being discharged after her most recent stent placement, she has had a soreness in her left ribs and back. She states that this has been pretty constant since then. She notes that tonight around 1845, she was laying down and experienced a new, stabbing pain in her left chest by her ribs. She states that at the time, her blood pressure and heart rate were also both high. She adds that she did take one nitroglycerin, which did not really help. She reports that she is nauseous now and has been intermittently for the past week. She adds that she occasionally has shortness of breath and night sweats. She notes this pain is different from the pain she experienced prior to her cardiac stent placement.  Since being in the ER, her pain has waxed and waned in severity and come and gone intermittently as well.  She denies chest pain with respiration, abdominal pain, fever, cough, dysuria, hematuria, vomiting, constipation, and diarrhea. She reports she has not missed any doses of her medications.       ASSESSMENT           Discharge Assessment  How are you doing now that you are home?: Patient states she is not feeling very good. Pain is still ongoing.  New symptoms since yesterday of added pain.  The pain that  was there before is located in the lower left rib.  Now pain is located over the left breast.  Random stabbing/cramping pains. Pain lasted from about 12-1, then it went away.  Pain is intermittent, not constant.  Denies any pain currently. Lidocaine does not seem to help the pain. Patient is also trying things to manage her POTS to see if this is related to the pain. Patient advised to call nurse line if her symptoms return.  How are your symptoms? (Red Flag symptoms escalate to triage hotline per guidelines): New;Unchanged  Do you feel your condition is stable enough to be safe at home until your provider visit?: Yes  Does the patient have their discharge instructions? : Yes  Does the patient have questions regarding their discharge instructions? : No  Were you started on any new medications or were there changes to any of your previous medications? : No  Does the patient have all of their medications?: Yes  Do you have questions regarding any of your medications? : No  Do you have all of your needed medical supplies or equipment (DME)?  (i.e. oxygen tank, CPAP, cane, etc.): Yes  Discharge follow-up appointment scheduled within 14 calendar days? : Yes  Discharge Follow Up Appointment Date: 02/07/24  Discharge Follow Up Appointment Scheduled with?: Specialty Care Provider         Post-op (Clinicians Only)  Did the patient have surgery or a procedure: No        PLAN                                                      Outpatient Plan:  Has follow up with cardiology on Wednesday.      Future Appointments   Date Time Provider Department Center   7/24/2024  8:00 AM Mihai Wilcox MD [facility]         For any urgent concerns, please contact our 24 hour nurse triage line: [phone] ([phone])         Ann Hurd RN

## 2024-02-13 ENCOUNTER — TELEPHONE (OUTPATIENT)
Dept: CARDIOLOGY | Facility: CLINIC | Age: 54
End: 2024-02-13
Payer: COMMERCIAL

## 2024-02-13 NOTE — TELEPHONE ENCOUNTER
Call out to Pt to discuss results, left voice mail on phone,asking Pt call for results.  Rhythm shows sinus -sinus tach highest . Copy to Dr Stanton desk to sign, have not received the original. SJ Hernandez RN

## 2024-04-17 NOTE — TELEPHONE ENCOUNTER
Called Pt left message asking she call for monitor results. Phone did not give out details of voice mail ownership. Results NSR/sinus tach. T David CAO

## 2024-07-19 ENCOUNTER — TELEPHONE (OUTPATIENT)
Dept: DERMATOLOGY | Facility: CLINIC | Age: 54
End: 2024-07-19
Payer: COMMERCIAL

## 2024-07-19 NOTE — TELEPHONE ENCOUNTER
Called and spoke with pt. Pt no longer needs this appt.     Ursula Mcmahon, Complex  7/19/2024 11:56 AM

## (undated) DEVICE — MANIFOLD KIT ANGIO AUTOMATED 014613

## (undated) DEVICE — DEFIB PRO-PADZ LVP LQD GEL ADULT 8900-2105-01

## (undated) DEVICE — CATH BALLOON EMERGE 2.5X15MM H7493918915250

## (undated) DEVICE — CATH BALLOON NC EMERGE 3.00X12MM H7493926712300

## (undated) DEVICE — TOTE ANGIO CORP PC15AT SAN32CC83O

## (undated) DEVICE — CATH DIAGNOSTIC RADIAL 5FR TIG 4.0

## (undated) DEVICE — GUIDEWIRE VASC 0.014INX180CM RUNTHROUGH 25-1011

## (undated) DEVICE — SLEEVE TR BAND RADIAL COMPRESSION DEVICE 24CM TRB24-REG

## (undated) DEVICE — INFL DVC BASIXCOMPAK PLYCRB 30 ATM 13IN 20ML IN4530

## (undated) DEVICE — INTRO GLIDESHEATH SLENDER 6FR 10X45CM 60-1060

## (undated) DEVICE — VALVE HEMOSTASIS GUARDIAN II OD8 FR GUIDEWIRE 8215

## (undated) DEVICE — CATH GUIDING BLUE YELLOW PTFE XB3 6FRX100CM 67005200

## (undated) DEVICE — KIT HAND CONTROL ANGIOTOUCH ACIST 65CM AT-P65

## (undated) DEVICE — WIRE GUIDE 0.035"X260CM SAFE-T-J EXCHANGE G00517

## (undated) RX ORDER — FENTANYL CITRATE 50 UG/ML
INJECTION, SOLUTION INTRAMUSCULAR; INTRAVENOUS
Status: DISPENSED
Start: 2024-01-11

## (undated) RX ORDER — LIDOCAINE HYDROCHLORIDE 10 MG/ML
INJECTION, SOLUTION EPIDURAL; INFILTRATION; INTRACAUDAL; PERINEURAL
Status: DISPENSED
Start: 2024-01-11

## (undated) RX ORDER — HEPARIN SODIUM 200 [USP'U]/100ML
INJECTION, SOLUTION INTRAVENOUS
Status: DISPENSED
Start: 2024-01-11

## (undated) RX ORDER — HEPARIN SODIUM 1000 [USP'U]/ML
INJECTION, SOLUTION INTRAVENOUS; SUBCUTANEOUS
Status: DISPENSED
Start: 2024-01-11

## (undated) RX ORDER — NITROGLYCERIN 5 MG/ML
VIAL (ML) INTRAVENOUS
Status: DISPENSED
Start: 2024-01-11

## (undated) RX ORDER — VERAPAMIL HYDROCHLORIDE 2.5 MG/ML
INJECTION, SOLUTION INTRAVENOUS
Status: DISPENSED
Start: 2024-01-11